# Patient Record
Sex: MALE | Race: OTHER | HISPANIC OR LATINO | Employment: UNEMPLOYED | URBAN - METROPOLITAN AREA
[De-identification: names, ages, dates, MRNs, and addresses within clinical notes are randomized per-mention and may not be internally consistent; named-entity substitution may affect disease eponyms.]

---

## 2019-01-09 ENCOUNTER — HOSPITAL ENCOUNTER (EMERGENCY)
Facility: HOSPITAL | Age: 49
Discharge: HOME | End: 2019-01-09
Attending: EMERGENCY MEDICINE | Admitting: EMERGENCY MEDICINE
Payer: COMMERCIAL

## 2019-01-09 VITALS
SYSTOLIC BLOOD PRESSURE: 99 MMHG | DIASTOLIC BLOOD PRESSURE: 55 MMHG | TEMPERATURE: 98.1 F | WEIGHT: 154 LBS | HEART RATE: 83 BPM | OXYGEN SATURATION: 97 % | RESPIRATION RATE: 18 BRPM

## 2019-01-09 DIAGNOSIS — R11.2 NAUSEA WITH VOMITING: Primary | ICD-10-CM

## 2019-01-09 DIAGNOSIS — R19.7 DIARRHEA: ICD-10-CM

## 2019-01-09 LAB
ALBUMIN SERPL BCP-MCNC: 3.9 G/DL (ref 3.5–5)
ALP SERPL-CCNC: 74 U/L (ref 46–116)
ALT SERPL W P-5'-P-CCNC: 53 U/L (ref 12–78)
ANION GAP SERPL CALCULATED.3IONS-SCNC: 11 MMOL/L (ref 4–13)
AST SERPL W P-5'-P-CCNC: 35 U/L (ref 5–45)
BACTERIA UR QL AUTO: ABNORMAL /HPF
BASOPHILS # BLD AUTO: 0.01 THOUSANDS/ΜL (ref 0–0.1)
BASOPHILS NFR BLD AUTO: 0 % (ref 0–1)
BILIRUB SERPL-MCNC: 0.33 MG/DL (ref 0.2–1)
BILIRUB UR QL STRIP: NEGATIVE
BUN SERPL-MCNC: 24 MG/DL (ref 5–25)
CALCIUM SERPL-MCNC: 9 MG/DL (ref 8.3–10.1)
CHLORIDE SERPL-SCNC: 99 MMOL/L (ref 100–108)
CLARITY UR: CLEAR
CO2 SERPL-SCNC: 25 MMOL/L (ref 21–32)
COLOR UR: YELLOW
COLOR, POC: YELLOW
CREAT SERPL-MCNC: 1.22 MG/DL (ref 0.6–1.3)
EOSINOPHIL # BLD AUTO: 0.04 THOUSAND/ΜL (ref 0–0.61)
EOSINOPHIL NFR BLD AUTO: 1 % (ref 0–6)
ERYTHROCYTE [DISTWIDTH] IN BLOOD BY AUTOMATED COUNT: 11.9 % (ref 11.6–15.1)
GFR SERPL CREATININE-BSD FRML MDRD: 71 ML/MIN/1.73SQ M
GLUCOSE SERPL-MCNC: 171 MG/DL (ref 65–140)
GLUCOSE UR STRIP-MCNC: NEGATIVE MG/DL
HCT VFR BLD AUTO: 44.8 % (ref 36.5–49.3)
HGB BLD-MCNC: 15.1 G/DL (ref 12–17)
HGB UR QL STRIP.AUTO: NEGATIVE
IMM GRANULOCYTES # BLD AUTO: 0.02 THOUSAND/UL (ref 0–0.2)
IMM GRANULOCYTES NFR BLD AUTO: 0 % (ref 0–2)
KETONES UR STRIP-MCNC: NEGATIVE MG/DL
LEUKOCYTE ESTERASE UR QL STRIP: NEGATIVE
LIPASE SERPL-CCNC: 165 U/L (ref 73–393)
LYMPHOCYTES # BLD AUTO: 0.93 THOUSANDS/ΜL (ref 0.6–4.47)
LYMPHOCYTES NFR BLD AUTO: 12 % (ref 14–44)
MAGNESIUM SERPL-MCNC: 1.7 MG/DL (ref 1.6–2.6)
MCH RBC QN AUTO: 31.4 PG (ref 26.8–34.3)
MCHC RBC AUTO-ENTMCNC: 33.7 G/DL (ref 31.4–37.4)
MCV RBC AUTO: 93 FL (ref 82–98)
MONOCYTES # BLD AUTO: 0.43 THOUSAND/ΜL (ref 0.17–1.22)
MONOCYTES NFR BLD AUTO: 6 % (ref 4–12)
MUCOUS THREADS UR QL AUTO: ABNORMAL
NEUTROPHILS # BLD AUTO: 6.34 THOUSANDS/ΜL (ref 1.85–7.62)
NEUTS SEG NFR BLD AUTO: 81 % (ref 43–75)
NITRITE UR QL STRIP: NEGATIVE
NON-SQ EPI CELLS URNS QL MICRO: ABNORMAL /HPF
NRBC BLD AUTO-RTO: 0 /100 WBCS
PH UR STRIP.AUTO: 7.5 [PH] (ref 4.5–8)
PLATELET # BLD AUTO: 216 THOUSANDS/UL (ref 149–390)
PMV BLD AUTO: 10.1 FL (ref 8.9–12.7)
POTASSIUM SERPL-SCNC: 3.7 MMOL/L (ref 3.5–5.3)
PROT SERPL-MCNC: 7.6 G/DL (ref 6.4–8.2)
PROT UR STRIP-MCNC: ABNORMAL MG/DL
RBC # BLD AUTO: 4.81 MILLION/UL (ref 3.88–5.62)
RBC #/AREA URNS AUTO: ABNORMAL /HPF
SODIUM SERPL-SCNC: 135 MMOL/L (ref 136–145)
SP GR UR STRIP.AUTO: 1.02 (ref 1–1.03)
UROBILINOGEN UR QL STRIP.AUTO: 0.2 E.U./DL
WBC # BLD AUTO: 7.77 THOUSAND/UL (ref 4.31–10.16)
WBC #/AREA URNS AUTO: ABNORMAL /HPF

## 2019-01-09 PROCEDURE — 36415 COLL VENOUS BLD VENIPUNCTURE: CPT | Performed by: EMERGENCY MEDICINE

## 2019-01-09 PROCEDURE — 99284 EMERGENCY DEPT VISIT MOD MDM: CPT

## 2019-01-09 PROCEDURE — 96374 THER/PROPH/DIAG INJ IV PUSH: CPT

## 2019-01-09 PROCEDURE — 83735 ASSAY OF MAGNESIUM: CPT | Performed by: EMERGENCY MEDICINE

## 2019-01-09 PROCEDURE — 81001 URINALYSIS AUTO W/SCOPE: CPT

## 2019-01-09 PROCEDURE — 83690 ASSAY OF LIPASE: CPT | Performed by: EMERGENCY MEDICINE

## 2019-01-09 PROCEDURE — 85025 COMPLETE CBC W/AUTO DIFF WBC: CPT | Performed by: EMERGENCY MEDICINE

## 2019-01-09 PROCEDURE — 80053 COMPREHEN METABOLIC PANEL: CPT | Performed by: EMERGENCY MEDICINE

## 2019-01-09 PROCEDURE — 96361 HYDRATE IV INFUSION ADD-ON: CPT

## 2019-01-09 RX ORDER — ONDANSETRON 2 MG/ML
4 INJECTION INTRAMUSCULAR; INTRAVENOUS ONCE
Status: COMPLETED | OUTPATIENT
Start: 2019-01-09 | End: 2019-01-09

## 2019-01-09 RX ORDER — ONDANSETRON 4 MG/1
4 TABLET, ORALLY DISINTEGRATING ORAL EVERY 6 HOURS PRN
Qty: 20 TABLET | Refills: 0 | Status: SHIPPED | OUTPATIENT
Start: 2019-01-09

## 2019-01-09 RX ORDER — TAMSULOSIN HYDROCHLORIDE 0.4 MG/1
0.4 CAPSULE ORAL
COMMUNITY
Start: 2018-03-06

## 2019-01-09 RX ADMIN — ONDANSETRON 4 MG: 2 INJECTION INTRAMUSCULAR; INTRAVENOUS at 10:39

## 2019-01-09 RX ADMIN — SODIUM CHLORIDE 1000 ML: 0.9 INJECTION, SOLUTION INTRAVENOUS at 10:38

## 2019-01-09 NOTE — DISCHARGE INSTRUCTIONS
Abiquiu el zofran según sea necesario para las náuseas, esto puede colocarse debajo de la lengua para disolverse si está vomitando  Asegúrese de beber muchos líquidos  Llame a acosta médico de cabecera; lo veremos en el consultorio para kat evaluación adicional si los síntomas persisten  Náusea y vómito severo, cuidados ambulatorios   INFORMACIÓN GENERAL:   La náusea y vómito severo  empieza de repente, empeora rápidamente y dura un corto periodo de [facility]  La náusea y el vómito pueden ser causados por el embarazo, el alcohol, kat infección o medicamentos  Síntomas relacionados comunes incluyen los siguientes:   · Fiebre    · Inflamación abdominal    · Dolor, sensibilidad o un bulto en el abdomen    · Sonidos salpicantes que se escuchan en acosta estómago cuando usted se mueve  Busque cuidados inmediatos para los siguientes síntomas:   · Connor en acosta vómito o heces    · Dolor repentino y severo en el pecho y parte superior del abdomen después de jose daniel vomitado muy azalea    · Mareos, sequedad en la boca y sed    · Muy poca orina o ausencia completa de la misma    · Debilidad muscular, calambres musculares y dificultad para respirar    · Latidos cardíacos más rápidos de lo normal    · Vómito por más de 50 horas  El tratamiento para la náusea y el vómito severo  puede incluir medicamentos para calmar acosta estómago y parar el vómito  Es probable que usted necesite de líquidos intravenosos si está deshidratado  Maneje acosta náusea y vómito:   · Abiquiu líquidos según indicaciones, para prevenir la deshidratación  Pregunte cuánto líquido reggie [employer] papo y cuáles líquidos son mejores para usted  Es probable que usted necesite reggie un líquido de rehidratación oral  Namrata líquido contiene agua, sales y azúcares que son todos necesarios para reemplazar los líquidos que el cuerpo ha perdido  Pregunte qué tipo de líquidos de rehidratación oral reggie, cuánto reggie y dónde conseguirlos      · Consuma comidas pequeñas con más frecuencia  Consuma cantidades pequeñas de alimentos cada 2 o 3 horas aunque no sienta hambre  Los alimentos en pantoja estómago pueden llegar a [employer]  · Evite el estrés  Busque formas de relajarse y [place] pantoja estrés  Los leroy de iwllem debidos al estrés pueden incluso causar náusea y vómito  Descanse y [employer]  Programe kat avtar con pantoja proveedor de kevin según indicaciones:  Anote raciel preguntas para que las recuerde axel raciel citas de seguimiento  ACUERDOS SOBRE PANTOJA CUIDADO:   Usted tiene el derecho de participar en la planificación de pantoja cuidado  Aprenda todo lo que pueda sobre pantoja condición y wendy darle tratamiento  Discuta con raciel médicos raciel opciones de tratamiento para juntos decidir el cuidado que usted quiere recibir  Usted siempre tiene el derecho a rechazar pantoja tratamiento  Esta información es sólo para uso en educación  Pantoja intención no es darle un consejo médico sobre enfermedades o tratamientos  Colsulte con pantoja Blu [employer] farmacéutico antes de seguir cualquier régimen médico para saber si es seguro y efectivo para usted  © 2014 [address] is for End User's use only and may not be sold, redistributed or otherwise used for commercial purposes  All illustrations and images included in CareNotes® are the copyrighted property of A D A M , Inc  or Chaz Whitaker

## 2019-01-09 NOTE — ED PROVIDER NOTES
History  Chief Complaint   Patient presents with    Abdominal Pain     Patient reports abdominal pain, vomiting, and diarrhea that started yesterday with subjective fevers; reports took Tylenol this morning around 8am      58 YO male presents with nausea, vomiting, diarrhea and abdominal discomfort  States this began overnight  Pt has had multiple episodes of NBNB emesis as well as loose and watery bowel movements  States generally upset stomach, no focal pain, this has been moderate in intensity, it has been intermittent and has improved over the course of the morning  Pt states his nausea is improved, has not vomited in the last few hours  Pt states he has multiple family members with similar symptoms, wife had same last week and has improved  Pt denies fevers or chills  Pt denies CP/SOB/F/C, no dysuria, burning on urination or blood in urine  History provided by:  Patient and relative   used: No    Abdominal Pain   Pain location:  Generalized  Pain quality: aching    Pain radiates to:  Does not radiate  Pain severity:  Mild  Onset quality:  Gradual  Duration:  1 day  Timing:  Intermittent  Progression:  Waxing and waning  Chronicity:  New  Context: sick contacts    Relieved by:  Nothing  Worsened by:  Nothing  Ineffective treatments:  None tried  Associated symptoms: diarrhea, nausea and vomiting    Associated symptoms: no chest pain, no dysuria, no fever and no shortness of breath    Diarrhea:     Quality:  Semi-solid    Severity:  Moderate    Duration:  1 day    Progression:  Improving  Nausea:     Severity:  Moderate    Onset quality:  Gradual    Duration:  1 day    Timing:  Constant    Progression:  Waxing and waning  Vomiting:     Quality:  Stomach contents    Severity:  Moderate    Duration:  1 day    Timing:  Intermittent    Progression:  Improving      Prior to Admission Medications   Prescriptions Last Dose Informant Patient Reported?  Taking?   tamsulosin (FLOMAX) 0 4 mg Yes Yes   Sig: Take 0 4 mg by mouth      Facility-Administered Medications: None       Past Medical History:   Diagnosis Date    No known health problems        Past Surgical History:   Procedure Laterality Date    NO PAST SURGERIES         History reviewed  No pertinent family history  I have reviewed and agree with the history as documented  Social History   Substance Use Topics    Smoking status: Never Smoker    Smokeless tobacco: Never Used    Alcohol use No        Review of Systems   Constitutional: Negative for fever  HENT: Negative for dental problem  Eyes: Negative for visual disturbance  Respiratory: Negative for shortness of breath  Cardiovascular: Negative for chest pain  Gastrointestinal: Positive for abdominal pain, diarrhea, nausea and vomiting  Genitourinary: Negative for dysuria and frequency  Musculoskeletal: Negative for neck pain and neck stiffness  Skin: Negative for rash  Neurological: Negative for dizziness, weakness and light-headedness  Psychiatric/Behavioral: Negative for agitation, behavioral problems and confusion  All other systems reviewed and are negative  Physical Exam  Physical Exam   Constitutional: He is oriented to person, place, and time  He appears well-developed and well-nourished  HENT:   Head: Normocephalic and atraumatic  Eyes: EOM are normal    Neck: Normal range of motion  Cardiovascular: Normal rate, regular rhythm and normal heart sounds  Pulmonary/Chest: Effort normal and breath sounds normal    Abdominal: Soft  There is no tenderness  Musculoskeletal: Normal range of motion  Neurological: He is alert and oriented to person, place, and time  Skin: Skin is warm and dry  Psychiatric: He has a normal mood and affect  His behavior is normal    Nursing note and vitals reviewed        Vital Signs  ED Triage Vitals [01/09/19 0956]   Temperature Pulse Respirations Blood Pressure SpO2   98 1 °F (36 7 °C) 72 18 100/54 99 % Temp Source Heart Rate Source Patient Position - Orthostatic VS BP Location FiO2 (%)   Oral Monitor Sitting Right arm --      Pain Score       Worst Possible Pain           Vitals:    01/09/19 0956 01/09/19 1154   BP: 100/54 99/55   Pulse: 72 83   Patient Position - Orthostatic VS: Sitting Lying       Visual Acuity      ED Medications  Medications   sodium chloride 0 9 % bolus 1,000 mL (0 mL Intravenous Stopped 1/9/19 1146)   ondansetron (ZOFRAN) injection 4 mg (4 mg Intravenous Given 1/9/19 1039)       Diagnostic Studies  Results Reviewed     Procedure Component Value Units Date/Time    Urine Microscopic [[id number]]  (Abnormal) Collected:  01/09/19 1204    Lab Status:  Final result Specimen:  Urine from Urine, Clean Catch Updated:  01/09/19 1210     RBC, UA 0-1 (A) /hpf      WBC, UA 1-2 (A) /hpf      Epithelial Cells Occasional /hpf      Bacteria, UA Innumerable (A) /hpf      MUCUS THREADS Occasional (A)    POCT urinalysis dipstick [[id number]]  (Abnormal) Resulted:  01/09/19 1153    Lab Status:  Final result Specimen:  Urine Updated:  01/09/19 1153     Color, UA yellow    ED Urine Macroscopic [[id number]]  (Abnormal) Collected:  01/09/19 1204    Lab Status:  Final result Specimen:  Urine Updated:  01/09/19 1148     Color, UA Yellow     Clarity, UA Clear     pH, UA 7 5     Leukocytes, UA Negative     Nitrite, UA Negative     Protein, UA 30 (1+) (A) mg/dl      Glucose, UA Negative mg/dl      Ketones, UA Negative mg/dl      Urobilinogen, UA 0 2 E U /dl      Bilirubin, UA Negative     Blood, UA Negative     Specific Gravity, UA 1 020    Narrative:       CLINITEK RESULT    Comprehensive metabolic panel [[id number]]  (Abnormal) Collected:  01/09/19 1037    Lab Status:  Final result Specimen:  Blood from Arm, Right Updated:  01/09/19 1058     Sodium 135 (L) mmol/L      Potassium 3 7 mmol/L      Chloride 99 (L) mmol/L      CO2 25 mmol/L      ANION GAP 11 mmol/L      BUN 24 mg/dL      Creatinine 1 22 mg/dL      Glucose 171 (H) mg/dL      Calcium 9 0 mg/dL      AST 35 U/L      ALT 53 U/L      Alkaline Phosphatase 74 U/L      Total Protein 7 6 g/dL      Albumin 3 9 g/dL      Total Bilirubin 0 33 mg/dL      eGFR 71 ml/min/1 73sq m     Narrative:         National Kidney Disease Education Program recommendations are as follows:  GFR calculation is accurate only with a steady state creatinine  Chronic Kidney disease less than 60 ml/min/1 73 sq  meters  Kidney failure less than 15 ml/min/1 73 sq  meters      Lipase [[id number]]  (Normal) Collected:  01/09/19 1037    Lab Status:  Final result Specimen:  Blood from Arm, Right Updated:  01/09/19 1058     Lipase 165 u/L     Magnesium [[id number]]  (Normal) Collected:  01/09/19 1037    Lab Status:  Final result Specimen:  Blood from Arm, Right Updated:  01/09/19 1058     Magnesium 1 7 mg/dL     CBC and differential [[id number]]  (Abnormal) Collected:  01/09/19 1037    Lab Status:  Final result Specimen:  Blood from Arm, Right Updated:  01/09/19 1047     WBC 7 77 Thousand/uL      RBC 4 81 Million/uL      Hemoglobin 15 1 g/dL      Hematocrit 44 8 %      MCV 93 fL      MCH 31 4 pg      MCHC 33 7 g/dL      RDW 11 9 %      MPV 10 1 fL      Platelets 938 Thousands/uL      nRBC 0 /100 WBCs      Neutrophils Relative 81 (H) %      Immat GRANS % 0 %      Lymphocytes Relative 12 (L) %      Monocytes Relative 6 %      Eosinophils Relative 1 %      Basophils Relative 0 %      Neutrophils Absolute 6 34 Thousands/µL      Immature Grans Absolute 0 02 Thousand/uL      Lymphocytes Absolute 0 93 Thousands/µL      Monocytes Absolute 0 43 Thousand/µL      Eosinophils Absolute 0 04 Thousand/µL      Basophils Absolute 0 01 Thousands/µL                  No orders to display              Procedures  Procedures       Phone Contacts  ED Phone Contact    ED Course                               MDM  Number of Diagnoses or Management Options  Diarrhea: new and requires workup  Nausea with vomiting: new and requires workup  Diagnosis management comments: 1  N/V/D - Pt has sick contacts with similar  Likely viral in origin  Pt states symptoms seemed to be improved prior to presentation in the ED  Will check urine, electrolytes, CBC  Give fluids, anti-emetics  Amount and/or Complexity of Data Reviewed  Clinical lab tests: ordered and reviewed  Obtain history from someone other than the patient: yes    Patient Progress  Patient progress: stable    CritCare Time    Disposition  Final diagnoses:   Nausea with vomiting   Diarrhea     Time reflects when diagnosis was documented in both MDM as applicable and the Disposition within this note     Time User Action Codes Description Comment    1/9/2019 12:13 PM Emeli Apley E Add [R11 2] Nausea with vomiting     1/9/2019 12:13 PM Emeli Apley E Add [R19 7] Diarrhea       ED Disposition     ED Disposition Condition Comment    Discharge  [address] discharge to home/self care  Condition at discharge: Stable        Follow-up Information     Follow up With Specialties Details Why Contact Info    Franklin Benavides MD Family Medicine Schedule an appointment as soon as possible for a visit  [address]  [facility]  [phone]      [employer]  Schedule an appointment as soon as possible for a visit  [phone]      Franklin Benavides, [address] [facility]  [address] [facility] [address]  [phone]            Discharge Medication List as of 1/9/2019 12:27 PM      START taking these medications    Details   ondansetron (ZOFRAN-ODT) 4 mg disintegrating tablet Take 1 tablet (4 mg total) by mouth every 6 (six) hours as needed for nausea or vomiting, Starting Wed 1/9/2019, Print         CONTINUE these medications which have NOT CHANGED    Details   tamsulosin (FLOMAX) 0 4 mg Take 0 4 mg by mouth, Starting Tue 3/6/2018, Historical Med           No discharge procedures on file      ED Provider  Electronically Signed by           Hans Valencia MD  01/11/19 5181

## 2019-02-25 ENCOUNTER — HOSPITAL ENCOUNTER (EMERGENCY)
Facility: HOSPITAL | Age: 49
Discharge: HOME | End: 2019-02-25
Attending: EMERGENCY MEDICINE | Admitting: EMERGENCY MEDICINE
Payer: COMMERCIAL

## 2019-02-25 VITALS
WEIGHT: 153.22 LBS | TEMPERATURE: 97.5 F | DIASTOLIC BLOOD PRESSURE: 75 MMHG | SYSTOLIC BLOOD PRESSURE: 163 MMHG | RESPIRATION RATE: 18 BRPM | HEART RATE: 74 BPM | OXYGEN SATURATION: 98 %

## 2019-02-25 DIAGNOSIS — K21.9 GERD (GASTROESOPHAGEAL REFLUX DISEASE): Primary | ICD-10-CM

## 2019-02-25 LAB
ALBUMIN SERPL BCP-MCNC: 3.8 G/DL (ref 3.5–5)
ALP SERPL-CCNC: 57 U/L (ref 46–116)
ALT SERPL W P-5'-P-CCNC: 41 U/L (ref 12–78)
ANION GAP SERPL CALCULATED.3IONS-SCNC: 7 MMOL/L (ref 4–13)
AST SERPL W P-5'-P-CCNC: 25 U/L (ref 5–45)
BASOPHILS # BLD AUTO: 0.03 THOUSANDS/ΜL (ref 0–0.1)
BASOPHILS NFR BLD AUTO: 1 % (ref 0–1)
BILIRUB SERPL-MCNC: 0.26 MG/DL (ref 0.2–1)
BUN SERPL-MCNC: 19 MG/DL (ref 5–25)
CALCIUM SERPL-MCNC: 8.8 MG/DL (ref 8.3–10.1)
CHLORIDE SERPL-SCNC: 103 MMOL/L (ref 100–108)
CO2 SERPL-SCNC: 29 MMOL/L (ref 21–32)
CREAT SERPL-MCNC: 0.95 MG/DL (ref 0.6–1.3)
EOSINOPHIL # BLD AUTO: 0.1 THOUSAND/ΜL (ref 0–0.61)
EOSINOPHIL NFR BLD AUTO: 2 % (ref 0–6)
ERYTHROCYTE [DISTWIDTH] IN BLOOD BY AUTOMATED COUNT: 12.1 % (ref 11.6–15.1)
GFR SERPL CREATININE-BSD FRML MDRD: 96 ML/MIN/1.73SQ M
GLUCOSE SERPL-MCNC: 93 MG/DL (ref 65–140)
HCT VFR BLD AUTO: 42.2 % (ref 36.5–49.3)
HGB BLD-MCNC: 14 G/DL (ref 12–17)
IMM GRANULOCYTES # BLD AUTO: 0.01 THOUSAND/UL (ref 0–0.2)
IMM GRANULOCYTES NFR BLD AUTO: 0 % (ref 0–2)
LIPASE SERPL-CCNC: 161 U/L (ref 73–393)
LYMPHOCYTES # BLD AUTO: 2.44 THOUSANDS/ΜL (ref 0.6–4.47)
LYMPHOCYTES NFR BLD AUTO: 42 % (ref 14–44)
MCH RBC QN AUTO: 31.1 PG (ref 26.8–34.3)
MCHC RBC AUTO-ENTMCNC: 33.2 G/DL (ref 31.4–37.4)
MCV RBC AUTO: 94 FL (ref 82–98)
MONOCYTES # BLD AUTO: 0.4 THOUSAND/ΜL (ref 0.17–1.22)
MONOCYTES NFR BLD AUTO: 7 % (ref 4–12)
NEUTROPHILS # BLD AUTO: 2.77 THOUSANDS/ΜL (ref 1.85–7.62)
NEUTS SEG NFR BLD AUTO: 48 % (ref 43–75)
NRBC BLD AUTO-RTO: 0 /100 WBCS
PLATELET # BLD AUTO: 214 THOUSANDS/UL (ref 149–390)
PMV BLD AUTO: 9.7 FL (ref 8.9–12.7)
POTASSIUM SERPL-SCNC: 3.8 MMOL/L (ref 3.5–5.3)
PROT SERPL-MCNC: 7.5 G/DL (ref 6.4–8.2)
RBC # BLD AUTO: 4.5 MILLION/UL (ref 3.88–5.62)
SODIUM SERPL-SCNC: 139 MMOL/L (ref 136–145)
TROPONIN I SERPL-MCNC: <0.02 NG/ML
WBC # BLD AUTO: 5.75 THOUSAND/UL (ref 4.31–10.16)

## 2019-02-25 PROCEDURE — 99283 EMERGENCY DEPT VISIT LOW MDM: CPT

## 2019-02-25 PROCEDURE — 85025 COMPLETE CBC W/AUTO DIFF WBC: CPT | Performed by: EMERGENCY MEDICINE

## 2019-02-25 PROCEDURE — 36415 COLL VENOUS BLD VENIPUNCTURE: CPT | Performed by: EMERGENCY MEDICINE

## 2019-02-25 PROCEDURE — 84484 ASSAY OF TROPONIN QUANT: CPT | Performed by: EMERGENCY MEDICINE

## 2019-02-25 PROCEDURE — 80053 COMPREHEN METABOLIC PANEL: CPT | Performed by: EMERGENCY MEDICINE

## 2019-02-25 PROCEDURE — 83690 ASSAY OF LIPASE: CPT | Performed by: EMERGENCY MEDICINE

## 2019-02-25 PROCEDURE — 93005 ELECTROCARDIOGRAM TRACING: CPT

## 2019-02-25 RX ORDER — SUCRALFATE 1 G/1
1 TABLET ORAL 4 TIMES DAILY
Qty: 10 TABLET | Refills: 0 | Status: SHIPPED | OUTPATIENT
Start: 2019-02-25

## 2019-02-25 RX ORDER — MAGNESIUM HYDROXIDE/ALUMINUM HYDROXICE/SIMETHICONE 120; 1200; 1200 MG/30ML; MG/30ML; MG/30ML
30 SUSPENSION ORAL ONCE
Status: DISCONTINUED | OUTPATIENT
Start: 2019-02-25 | End: 2019-02-25 | Stop reason: HOSPADM

## 2019-02-25 RX ORDER — FAMOTIDINE 20 MG/1
20 TABLET, FILM COATED ORAL ONCE
Status: DISCONTINUED | OUTPATIENT
Start: 2019-02-25 | End: 2019-02-25 | Stop reason: HOSPADM

## 2019-02-25 RX ORDER — FAMOTIDINE 20 MG/1
20 TABLET, FILM COATED ORAL 2 TIMES DAILY
Qty: 30 TABLET | Refills: 0 | Status: SHIPPED | OUTPATIENT
Start: 2019-02-25

## 2019-02-25 NOTE — ED PROVIDER NOTES
History  Chief Complaint   Patient presents with    Heartburn     patient with "squeezing" feeling that travels up and down from throat to upper abdomen for 1 month  deneis cough, n/v/d  intermittant  "Feels like something is going up and down my throat" x 1 month  Reports sometimes feels it come up more while he's sleeping  Feels some burning, but states it's not painful  Intermittent  Denies abd pain, N/V  Not worsened with food  He has not attempted to see his PCP for this issue  History provided by:  Patient   used: Yes ([employer] [id number])    Heartburn   Location:  Chest and throat  Duration:  1 month  Timing:  Intermittent  Progression:  Unchanged  Chronicity:  New  Relieved by:  Nothing tried  Worsened by:  Food  Ineffective treatments:  Nothing tried  Associated symptoms: no abdominal pain, no chest pain, no cough, no fever, no nausea, no shortness of breath and no vomiting        Prior to Admission Medications   Prescriptions Last Dose Informant Patient Reported? Taking?   ondansetron (ZOFRAN-ODT) 4 mg disintegrating tablet   No No   Sig: Take 1 tablet (4 mg total) by mouth every 6 (six) hours as needed for nausea or vomiting   tamsulosin (FLOMAX) 0 4 mg   Yes No   Sig: Take 0 4 mg by mouth      Facility-Administered Medications: None       Past Medical History:   Diagnosis Date    No known health problems        Past Surgical History:   Procedure Laterality Date    NO PAST SURGERIES         History reviewed  No pertinent family history  I have reviewed and agree with the history as documented  Social History     Tobacco Use    Smoking status: Never Smoker    Smokeless tobacco: Never Used   Substance Use Topics    Alcohol use: No    Drug use: No        Review of Systems   Constitutional: Negative for chills, diaphoresis and fever  Respiratory: Negative for cough, chest tightness and shortness of breath      Cardiovascular: Negative for chest pain, palpitations and leg swelling  Gastrointestinal: Negative for abdominal pain, nausea and vomiting  Musculoskeletal: Negative for back pain and neck pain  Skin: Negative for pallor  Neurological: Negative for dizziness, syncope, facial asymmetry, speech difficulty, weakness, light-headedness and numbness  All other systems reviewed and are negative  Physical Exam  Physical Exam   Constitutional: He is oriented to person, place, and time  He appears well-developed and well-nourished  No distress  HENT:   Head: Normocephalic  Mouth/Throat: Oropharynx is clear and moist and mucous membranes are normal    Neck: Normal range of motion  Neck supple  Cardiovascular: Normal rate, regular rhythm, normal heart sounds and intact distal pulses  Exam reveals no gallop and no friction rub  No murmur heard  Pulmonary/Chest: Effort normal and breath sounds normal  No respiratory distress  He has no wheezes  He has no rales  Abdominal: Soft  Normal appearance and bowel sounds are normal  He exhibits no distension and no mass  There is no hepatosplenomegaly  There is no tenderness  There is no rigidity, no rebound, no guarding, no CVA tenderness, no tenderness at McBurney's point and negative Graff's sign  Lymphadenopathy:     He has no cervical adenopathy  Neurological: He is alert and oriented to person, place, and time  Skin: Skin is warm, dry and intact  No rash noted  No pallor  Nursing note and vitals reviewed        Vital Signs  ED Triage Vitals [02/25/19 1810]   Temperature Pulse Respirations Blood Pressure SpO2   97 5 °F (36 4 °C) 74 18 163/75 98 %      Temp Source Heart Rate Source Patient Position - Orthostatic VS BP Location FiO2 (%)   Oral Monitor Sitting Right arm --      Pain Score       No Pain           Vitals:    02/25/19 1810   BP: 163/75   Pulse: 74   Patient Position - Orthostatic VS: Sitting       Visual Acuity      ED Medications  Medications   aluminum-magnesium hydroxide-simethicone (MYLANTA) 200-200-20 mg/5 mL oral suspension 30 mL (30 mL Oral Not Given 2/25/19 1835)   lidocaine viscous (XYLOCAINE) 2 % mucosal solution 15 mL (15 mL Swish & Spit Not Given 2/25/19 1835)   famotidine (PEPCID) tablet 20 mg (20 mg Oral Not Given 2/25/19 1835)       Diagnostic Studies  Results Reviewed     Procedure Component Value Units Date/Time    Troponin I [[id number]]  (Normal) Collected:  02/25/19 1840    Lab Status:  Final result Specimen:  Blood from Arm, Left Updated:  02/25/19 1903     Troponin I <0 02 ng/mL     Comprehensive metabolic panel [[id number]] Collected:  02/25/19 1840    Lab Status:  Final result Specimen:  Blood from Arm, Left Updated:  02/25/19 1902     Sodium 139 mmol/L      Potassium 3 8 mmol/L      Chloride 103 mmol/L      CO2 29 mmol/L      ANION GAP 7 mmol/L      BUN 19 mg/dL      Creatinine 0 95 mg/dL      Glucose 93 mg/dL      Calcium 8 8 mg/dL      AST 25 U/L      ALT 41 U/L      Alkaline Phosphatase 57 U/L      Total Protein 7 5 g/dL      Albumin 3 8 g/dL      Total Bilirubin 0 26 mg/dL      eGFR 96 ml/min/1 73sq m     Narrative:       National Kidney Disease Education Program recommendations are as follows:  GFR calculation is accurate only with a steady state creatinine  Chronic Kidney disease less than 60 ml/min/1 73 sq  meters  Kidney failure less than 15 ml/min/1 73 sq  meters      Lipase [[id number]]  (Normal) Collected:  02/25/19 1840    Lab Status:  Final result Specimen:  Blood from Arm, Left Updated:  02/25/19 1902     Lipase 161 u/L     CBC and differential [[id number]] Collected:  02/25/19 1840    Lab Status:  Final result Specimen:  Blood from Arm, Left Updated:  02/25/19 1845     WBC 5 75 Thousand/uL      RBC 4 50 Million/uL      Hemoglobin 14 0 g/dL      Hematocrit 42 2 %      MCV 94 fL      MCH 31 1 pg      MCHC 33 2 g/dL      RDW 12 1 %      MPV 9 7 fL      Platelets 185 Thousands/uL      nRBC 0 /100 WBCs      Neutrophils Relative 48 %      Immat GRANS % 0 %      Lymphocytes Relative 42 %      Monocytes Relative 7 %      Eosinophils Relative 2 %      Basophils Relative 1 %      Neutrophils Absolute 2 77 Thousands/µL      Immature Grans Absolute 0 01 Thousand/uL      Lymphocytes Absolute 2 44 Thousands/µL      Monocytes Absolute 0 40 Thousand/µL      Eosinophils Absolute 0 10 Thousand/µL      Basophils Absolute 0 03 Thousands/µL                  No orders to display              Procedures  ECG 12 Lead Documentation  Date/Time: 2/25/2019 6:34 PM  Performed by: Reji Blanco,   Authorized by: Reji Blanco, DO     Indications / Diagnosis:  Reflux symptoms  ECG reviewed by me, the ED Provider: yes    Patient location:  Bedside  Previous ECG:     Previous ECG:  Unavailable  Rate:     ECG rate:  59    ECG rate assessment: normal    Rhythm:     Rhythm: sinus rhythm    Ectopy:     Ectopy: none    QRS:     QRS axis:  Normal    QRS intervals:  Normal  ST segments:     ST segments:  Normal  T waves:     T waves: normal             Phone Contacts  ED Phone Contact    ED Course                               MDM  Number of Diagnoses or Management Options  GERD (gastroesophageal reflux disease):   Diagnosis management comments: Symptoms c/w reflux (explained this to pt and that if labs are negative, he will f/u with PCP) - Will check CBC as marker of infection, CMP to r/o hepatitis/biliary disease, lipase to r/o pancreatitis, EKG to r/o STEMI, troponin to r/o NSTEMI  Will give pepcid/GI cocktail         Amount and/or Complexity of Data Reviewed  Clinical lab tests: reviewed and ordered  Tests in the medicine section of CPT®: ordered and reviewed        Disposition  Final diagnoses:   GERD (gastroesophageal reflux disease)     Time reflects when diagnosis was documented in both MDM as applicable and the Disposition within this note     Time User Action Codes Description Comment    2/25/2019  7:04 PM Alyce Pollard Add [K21 9] GERD (gastroesophageal reflux disease)       ED Disposition     ED Disposition Condition Date/Time Comment    Discharge Stable Mon Feb 25, 2019  7:04 PM Aniceto Beasley discharge to home/self care  Follow-up Information     Follow up With Specialties Details Why Contact Info    Erasto Pollard MD Family Medicine Schedule an appointment as soon as possible for a visit   [address]  [address] [phone]            Patient's Medications   Discharge Prescriptions    FAMOTIDINE (PEPCID) 20 MG TABLET    Take 1 tablet (20 mg total) by mouth 2 (two) times a day       Start Date: 2/25/2019 End Date: --       Order Dose: 20 mg       Quantity: 30 tablet    Refills: 0    SUCRALFATE (CARAFATE) 1 G TABLET    Take 1 tablet (1 g total) by mouth 4 (four) times a day       Start Date: 2/25/2019 End Date: --       Order Dose: 1 g       Quantity: 10 tablet    Refills: 0     No discharge procedures on file      ED Provider  Electronically Signed by           Reji Blanco, DO  02/25/19 2824

## 2019-02-26 LAB
ATRIAL RATE: 59 BPM
P AXIS: 64 DEGREES
PR INTERVAL: 190 MS
QRS AXIS: 19 DEGREES
QRSD INTERVAL: 98 MS
QT INTERVAL: 404 MS
QTC INTERVAL: 399 MS
T WAVE AXIS: 31 DEGREES
VENTRICULAR RATE: 59 BPM

## 2019-02-26 PROCEDURE — 93010 ELECTROCARDIOGRAM REPORT: CPT | Performed by: INTERNAL MEDICINE

## 2019-08-09 ENCOUNTER — HOSPITAL ENCOUNTER (EMERGENCY)
Facility: HOSPITAL | Age: 49
Discharge: HOME | End: 2019-08-09
Attending: EMERGENCY MEDICINE
Payer: COMMERCIAL

## 2019-08-09 VITALS
TEMPERATURE: 98.2 F | DIASTOLIC BLOOD PRESSURE: 75 MMHG | WEIGHT: 147.27 LBS | HEART RATE: 52 BPM | RESPIRATION RATE: 16 BRPM | OXYGEN SATURATION: 98 % | SYSTOLIC BLOOD PRESSURE: 143 MMHG

## 2019-08-09 DIAGNOSIS — R35.0 URINARY FREQUENCY: ICD-10-CM

## 2019-08-09 DIAGNOSIS — R10.9 ABDOMINAL PAIN: Primary | ICD-10-CM

## 2019-08-09 LAB
BILIRUB UR QL STRIP: NEGATIVE
CLARITY UR: CLEAR
COLOR UR: YELLOW
COLOR, POC: YELLOW
GLUCOSE UR STRIP-MCNC: NEGATIVE MG/DL
HGB UR QL STRIP.AUTO: NEGATIVE
KETONES UR STRIP-MCNC: NEGATIVE MG/DL
LEUKOCYTE ESTERASE UR QL STRIP: NEGATIVE
NITRITE UR QL STRIP: NEGATIVE
PH UR STRIP.AUTO: 5.5 [PH] (ref 4.5–8)
PROT UR STRIP-MCNC: NEGATIVE MG/DL
SP GR UR STRIP.AUTO: 1.02 (ref 1–1.03)
UROBILINOGEN UR QL STRIP.AUTO: 0.2 E.U./DL

## 2019-08-09 PROCEDURE — 99283 EMERGENCY DEPT VISIT LOW MDM: CPT | Performed by: EMERGENCY MEDICINE

## 2019-08-09 PROCEDURE — 99284 EMERGENCY DEPT VISIT MOD MDM: CPT

## 2019-08-09 PROCEDURE — 81003 URINALYSIS AUTO W/O SCOPE: CPT

## 2019-08-09 NOTE — ED PROVIDER NOTES
History  Chief Complaint   Patient presents with    Pelvic Pain     on tamsulosin   4 mg x 5 years  now reports pain to lower pelvis/penis, constant  urination w/out difficulty, but "it slows at night"  denies fever/chills  History provided by:  Patient   used: No    Pelvic Pain   Associated symptoms: abdominal pain    Associated symptoms: no chest pain, no cough, no diarrhea, no fever, no headaches, no nausea, no rash, no shortness of breath, no sore throat and no vomiting    Abdominal pain:     Location:  LLQ    Quality: aching      Severity:  Mild    Onset quality:  Gradual    Duration:  5 days    Timing:  Intermittent    Progression:  Waxing and waning    Chronicity:  New  Abdominal Pain   Associated symptoms: no chest pain, no chills, no cough, no diarrhea, no dysuria, no fever, no nausea, no shortness of breath, no sore throat and no vomiting        Prior to Admission Medications   Prescriptions Last Dose Informant Patient Reported? Taking?   famotidine (PEPCID) 20 mg tablet   No No   Sig: Take 1 tablet (20 mg total) by mouth 2 (two) times a day   ondansetron (ZOFRAN-ODT) 4 mg disintegrating tablet   No No   Sig: Take 1 tablet (4 mg total) by mouth every 6 (six) hours as needed for nausea or vomiting   sucralfate (CARAFATE) 1 g tablet   No No   Sig: Take 1 tablet (1 g total) by mouth 4 (four) times a day   tamsulosin (FLOMAX) 0 4 mg   Yes No   Sig: Take 0 4 mg by mouth      Facility-Administered Medications: None       Past Medical History:   Diagnosis Date    No known health problems        Past Surgical History:   Procedure Laterality Date    NO PAST SURGERIES         History reviewed  No pertinent family history  I have reviewed and agree with the history as documented      Social History     Tobacco Use    Smoking status: Never Smoker    Smokeless tobacco: Never Used   Substance Use Topics    Alcohol use: No    Drug use: No        Review of Systems   Constitutional: Negative for chills and fever  HENT: Negative for facial swelling, sore throat and trouble swallowing  Eyes: Negative for pain and visual disturbance  Respiratory: Negative for cough and shortness of breath  Cardiovascular: Negative for chest pain and leg swelling  Gastrointestinal: Positive for abdominal pain  Negative for blood in stool, diarrhea, nausea and vomiting  Genitourinary: Positive for pelvic pain  Negative for dysuria and flank pain  Musculoskeletal: Negative for back pain, neck pain and neck stiffness  Skin: Negative for pallor and rash  Allergic/Immunologic: Negative for environmental allergies and immunocompromised state  Neurological: Negative for dizziness and headaches  Hematological: Negative for adenopathy  Does not bruise/bleed easily  Psychiatric/Behavioral: Negative for agitation and behavioral problems  All other systems reviewed and are negative  Physical Exam  Physical Exam   Constitutional: He is oriented to person, place, and time  He appears well-developed and well-nourished  No distress  HENT:   Head: Normocephalic and atraumatic  Eyes: EOM are normal    Neck: Normal range of motion  Neck supple  Cardiovascular: Normal rate, regular rhythm, normal heart sounds and intact distal pulses  Pulmonary/Chest: Effort normal and breath sounds normal    Abdominal: Soft  Bowel sounds are normal  There is no tenderness  There is no rebound and no guarding  Musculoskeletal: Normal range of motion  Neurological: He is alert and oriented to person, place, and time  Skin: Skin is warm and dry  Psychiatric: He has a normal mood and affect  Nursing note and vitals reviewed        Vital Signs  ED Triage Vitals   Temperature Pulse Respirations Blood Pressure SpO2   08/09/19 1322 08/09/19 1322 08/09/19 1322 08/09/19 1322 08/09/19 1322   98 2 °F (36 8 °C) 56 20 130/81 97 %      Temp src Heart Rate Source Patient Position - Orthostatic VS BP Location FiO2 (%) -- 08/09/19 1528 08/09/19 1528 08/09/19 1528 --    Monitor Lying Right arm       Pain Score       08/09/19 1322       2           Vitals:    08/09/19 1322 08/09/19 1528   BP: 130/81 143/75   Pulse: 56 (!) 52   Patient Position - Orthostatic VS:  Lying         Visual Acuity      ED Medications  Medications - No data to display    Diagnostic Studies  Results Reviewed     Procedure Component Value Units Date/Time    POCT urinalysis dipstick [[id number]]  (Normal) Resulted:  08/09/19 1507    Lab Status:  Final result Specimen:  Urine Updated:  08/09/19 1507     Color, UA yellow    ED Urine Macroscopic [[id number]] Collected:  08/09/19 1520    Lab Status:  Final result Specimen:  Urine Updated:  08/09/19 1506     Color, UA Yellow     Clarity, UA Clear     pH, UA 5 5     Leukocytes, UA Negative     Nitrite, UA Negative     Protein, UA Negative mg/dl      Glucose, UA Negative mg/dl      Ketones, UA Negative mg/dl      Urobilinogen, UA 0 2 E U /dl      Bilirubin, UA Negative     Blood, UA Negative     Specific Gravity, UA 1 025    Narrative:       CLINITEK RESULT                 No orders to display              Procedures  Procedures       ED Course  ED Course as of Aug 09 1742   Fri Aug 09, 2019   1516 Leukocytes, UA: Negative   1516 Nitrite, UA: Negative   1516 Urine normal   Patient informed about the urine results as his main concern was possible UTI     Blood, UA: Negative   1555 Bladder scan 17 ml       1559 Patient re-evaluated, in no acute distress, no acute pain, no flank pain or CVA tenderness, doubt renal colic based on presentation however with possible urinary symptoms, on tamsulosin for many years, advise follow-up with the urologist                                   MDM  Number of Diagnoses or Management Options  Abdominal pain:   Urinary frequency: new and requires workup  Diagnosis management comments: A 59-year-old male, comes in with complaints of left lower quadrant abdominal pain, states that the pain has been going on for few days, feels that he may have a UTI has history of same although denies flank pain, fever, dysuria, hematuria, diarrhea  On exam no acute distress:  Vital signs stable, patient is well appearing, abdomen is soft nontender, no CVA tenderness  Urine is negative for infection  CT scan 17 ml  Patient informed about no infection in the urine as that was his main concern  No urinary retention  Will advise follow-up with PCP/urologist        Amount and/or Complexity of Data Reviewed  Clinical lab tests: ordered and reviewed  Tests in the medicine section of CPT®: reviewed and ordered        Disposition  Final diagnoses:   Abdominal pain   Urinary frequency     Time reflects when diagnosis was documented in both MDM as applicable and the Disposition within this note     Time User Action Codes Description Comment    8/9/2019  4:00 PM Gwenette Meals Add [R10 9] Abdominal pain     8/9/2019  4:00 PM Gwenette Meals Add [R35 0] Urinary frequency       ED Disposition     ED Disposition Condition Date/Time Comment    Discharge Stable Fri Aug 9, 2019  3:59 PM Tejal Carr discharge to home/self care              Follow-up Information     Follow up With Specialties Details Why Contact Info Additional [address] For Urology [facility] Schedule an appointment as soon as possible for a visit   [address] [address]  [address] [place] [address] For Urology [facility], [address], [address]    Bj Rodriguez MD Family Medicine Schedule an appointment as soon as possible for a visit   [address]  [phone]             Discharge Medication List as of 8/9/2019  4:00 PM      CONTINUE these medications which have NOT CHANGED    Details   famotidine (PEPCID) 20 mg tablet Take 1 tablet (20 mg total) by mouth 2 (two) times a day, Starting Mon 2/25/2019, Print      ondansetron (ZOFRAN-ODT) 4 mg disintegrating tablet Take 1 tablet (4 mg total) by mouth every 6 (six) hours as needed for nausea or vomiting, Starting Wed 1/9/2019, Print      sucralfate (CARAFATE) 1 g tablet Take 1 tablet (1 g total) by mouth 4 (four) times a day, Starting Mon 2/25/2019, Print      tamsulosin (FLOMAX) 0 4 mg Take 0 4 mg by mouth, Starting Tue 3/6/2018, Historical Med           No discharge procedures on file      ED Provider  Electronically Signed by           Jennifer Mace MD  08/09/19 5371

## 2019-10-01 ENCOUNTER — HOSPITAL ENCOUNTER (EMERGENCY)
Facility: HOSPITAL | Age: 49
Discharge: HOME | End: 2019-10-01
Attending: EMERGENCY MEDICINE
Payer: COMMERCIAL

## 2019-10-01 VITALS
WEIGHT: 151.01 LBS | SYSTOLIC BLOOD PRESSURE: 128 MMHG | HEART RATE: 61 BPM | DIASTOLIC BLOOD PRESSURE: 60 MMHG | TEMPERATURE: 98.1 F | RESPIRATION RATE: 18 BRPM | OXYGEN SATURATION: 96 %

## 2019-10-01 DIAGNOSIS — N39.0 UTI (URINARY TRACT INFECTION): Primary | ICD-10-CM

## 2019-10-01 DIAGNOSIS — R10.2 SUPRAPUBIC ABDOMINAL PAIN: ICD-10-CM

## 2019-10-01 DIAGNOSIS — R30.0 DYSURIA: ICD-10-CM

## 2019-10-01 LAB
AMORPH PHOS CRY URNS QL MICRO: NORMAL /HPF
BACTERIA UR QL AUTO: NORMAL /HPF
BILIRUB UR QL STRIP: NEGATIVE
CLARITY UR: ABNORMAL
COLOR UR: ABNORMAL
COLOR, POC: NORMAL
GLUCOSE UR STRIP-MCNC: ABNORMAL MG/DL
HGB UR QL STRIP.AUTO: NEGATIVE
KETONES UR STRIP-MCNC: NEGATIVE MG/DL
LEUKOCYTE ESTERASE UR QL STRIP: NEGATIVE
NITRITE UR QL STRIP: POSITIVE
NON-SQ EPI CELLS URNS QL MICRO: NORMAL /HPF
PH UR STRIP.AUTO: 7.5 [PH] (ref 4.5–8)
PROT UR STRIP-MCNC: ABNORMAL MG/DL
RBC #/AREA URNS AUTO: NORMAL /HPF
SP GR UR STRIP.AUTO: 1.01 (ref 1–1.03)
UROBILINOGEN UR QL STRIP.AUTO: 0.2 E.U./DL
WBC #/AREA URNS AUTO: NORMAL /HPF

## 2019-10-01 PROCEDURE — 81001 URINALYSIS AUTO W/SCOPE: CPT

## 2019-10-01 PROCEDURE — 99284 EMERGENCY DEPT VISIT MOD MDM: CPT | Performed by: EMERGENCY MEDICINE

## 2019-10-01 PROCEDURE — 87491 CHLMYD TRACH DNA AMP PROBE: CPT | Performed by: EMERGENCY MEDICINE

## 2019-10-01 PROCEDURE — 99284 EMERGENCY DEPT VISIT MOD MDM: CPT

## 2019-10-01 PROCEDURE — 51798 US URINE CAPACITY MEASURE: CPT

## 2019-10-01 PROCEDURE — 81002 URINALYSIS NONAUTO W/O SCOPE: CPT | Performed by: EMERGENCY MEDICINE

## 2019-10-01 PROCEDURE — 87591 N.GONORRHOEAE DNA AMP PROB: CPT | Performed by: EMERGENCY MEDICINE

## 2019-10-01 RX ORDER — CEPHALEXIN 500 MG/1
500 CAPSULE ORAL 2 TIMES DAILY
Qty: 14 CAPSULE | Refills: 0 | Status: SHIPPED | OUTPATIENT
Start: 2019-10-01 | End: 2019-10-08

## 2019-10-01 NOTE — ED NOTES
Patient ambulatory to bathroom with steady gait at this time      Roderick Scherer RN  10/01/19 6736

## 2019-10-01 NOTE — ED PROVIDER NOTES
History  Chief Complaint   Patient presents with    Abdominal Pain     Pt  reports lower abdominal pain  Pt  reports the pain is right above his penis and it feels hot  Pt  denies N/V/D  This is a 17-year-old male that presents with complaints of suprapubic abdominal pain and dysuria  Patient reports that experiencing symptoms for greater than 1 year, he is followed by Urology, patient states that approximately 2 weeks ago he was evaluated by Urology who placed him on tamsulosin  At that time they told him he did not have any evidence infection that his prostate was "fine "  Patient states that in the past week he has had worsening of his pain  He also reports that he has burning when he urinates  He denies any penile discharge, he denies any hematuria  He denies any radiation of the pain to other areas, he denies any new onset back pain or flank pain  He denies any fever chills nausea vomiting  History provided by:  Patient   used: Yes    Difficulty Urinating   Presenting symptoms: dysuria    Context: during urination    Relieved by:  Nothing  Worsened by:  Nothing  Associated symptoms: abdominal pain    Associated symptoms: no diarrhea, no fever, no flank pain, no hematuria, no nausea and no vomiting    Abdominal pain:     Location:  Suprapubic    Quality: burning      Severity:  Mild    Onset quality:  Gradual    Timing:  Constant    Progression:  Waxing and waning      Prior to Admission Medications   Prescriptions Last Dose Informant Patient Reported?  Taking?   famotidine (PEPCID) 20 mg tablet   No No   Sig: Take 1 tablet (20 mg total) by mouth 2 (two) times a day   ondansetron (ZOFRAN-ODT) 4 mg disintegrating tablet   No No   Sig: Take 1 tablet (4 mg total) by mouth every 6 (six) hours as needed for nausea or vomiting   sucralfate (CARAFATE) 1 g tablet   No No   Sig: Take 1 tablet (1 g total) by mouth 4 (four) times a day   tamsulosin (FLOMAX) 0 4 mg   Yes Yes   Sig: Take 0 4 mg by mouth      Facility-Administered Medications: None       Past Medical History:   Diagnosis Date    No known health problems        Past Surgical History:   Procedure Laterality Date    NO PAST SURGERIES         History reviewed  No pertinent family history  I have reviewed and agree with the history as documented  Social History     Tobacco Use    Smoking status: Never Smoker    Smokeless tobacco: Never Used   Substance Use Topics    Alcohol use: No    Drug use: No        Review of Systems   Constitutional: Negative for chills and fever  HENT: Negative for congestion and sore throat  Eyes: Negative for photophobia and visual disturbance  Respiratory: Negative for cough and shortness of breath  Cardiovascular: Negative for chest pain and leg swelling  Gastrointestinal: Positive for abdominal pain  Negative for blood in stool, diarrhea, nausea and vomiting  Genitourinary: Positive for dysuria  Negative for flank pain and hematuria  Musculoskeletal: Negative for neck pain and neck stiffness  Skin: Negative for rash and wound  Neurological: Negative for weakness and numbness  Psychiatric/Behavioral: Negative for behavioral problems and confusion  All other systems reviewed and are negative  Physical Exam  ED Triage Vitals [10/01/19 1601]   Temperature Pulse Respirations Blood Pressure SpO2   98 1 °F (36 7 °C) 67 18 144/81 96 %      Temp Source Heart Rate Source Patient Position - Orthostatic VS BP Location FiO2 (%)   Oral Monitor Sitting Right arm --      Pain Score       3             Orthostatic Vital Signs  Vitals:    10/01/19 1601 10/01/19 1740   BP: 144/81 128/60   Pulse: 67 61   Patient Position - Orthostatic VS: Sitting Lying       Physical Exam   Constitutional: He appears well-developed  No distress  HENT:   Head: Normocephalic     Right Ear: External ear normal    Left Ear: External ear normal    Nose: Nose normal    Mouth/Throat: Oropharynx is clear and moist  Eyes: Conjunctivae and EOM are normal  Right eye exhibits no discharge  Left eye exhibits no discharge  Neck: Normal range of motion  No tracheal deviation present  Cardiovascular: Normal rate, regular rhythm, normal heart sounds and intact distal pulses  Pulmonary/Chest: Effort normal and breath sounds normal  No stridor  No respiratory distress  He has no wheezes  He has no rales  He exhibits no tenderness  Abdominal: Soft  Bowel sounds are normal  He exhibits no distension  There is tenderness in the suprapubic area  There is no rigidity, no rebound, no guarding and no CVA tenderness  Genitourinary: Testes normal and penis normal  Cremasteric reflex is present  Right testis shows no mass and no tenderness  Left testis shows no mass and no tenderness  No penile erythema or penile tenderness  No discharge found  Musculoskeletal: He exhibits no tenderness  Neurological: He is alert  No cranial nerve deficit or sensory deficit  He exhibits normal muscle tone  Skin: Skin is warm and dry  Capillary refill takes less than 2 seconds  No rash noted  He is not diaphoretic  Psychiatric: His behavior is normal    Nursing note and vitals reviewed  ED Medications  Medications - No data to display    Diagnostic Studies  Results Reviewed     Procedure Component Value Units Date/Time    Urine Microscopic [[id number]] Collected:  10/01/19 1631    Lab Status:  Final result Specimen:  Urine, Clean Catch Updated:  10/01/19 1724     RBC, UA None Seen /hpf      WBC, UA None Seen /hpf      Epithelial Cells None Seen /hpf      Bacteria, UA Occasional /hpf      AMORPH PHOSPATES Moderate /hpf     Chlamydia/GC amplified DNA by PCR [[id number]] Collected:  10/01/19 1638    Lab Status:   In process Specimen:  Urine, Other Updated:  10/01/19 1640    POCT urinalysis dipstick [[id number]]  (Normal) Resulted:  10/01/19 1636    Lab Status:  Final result Specimen:  Urine Updated:  10/01/19 1636     Color, UA orange    ED Urine Macroscopic [[id number]]  (Abnormal) Collected:  10/01/19 1631    Lab Status:  Final result Specimen:  Urine Updated:  10/01/19 1633     Color, UA Orange     Clarity, UA Cloudy     pH, UA 7 5     Leukocytes, UA Negative     Nitrite, UA Positive     Protein, UA 30 (1+) mg/dl      Glucose,  (1/10%) mg/dl      Ketones, UA Negative mg/dl      Urobilinogen, UA 0 2 E U /dl      Bilirubin, UA Negative     Blood, UA Negative     Specific Gravity, UA 1 015    Narrative:       CLINITEK RESULT                 No orders to display         Procedures  Procedures        ED Course  ED Course as of Oct 01 1801   Tue Oct 01, 2019   1736 Will treat for UTI   Nitrite, UA(!): Positive   1736 Bacteria, UA: Occasional                               MDM  Number of Diagnoses or Management Options  Diagnosis management comments: This is a 33-year-old male that presents with complaints of dysuria and suprapubic abdominal pain, this is noted to have a history of BPH is currently taking tamsulosin  Differential likely UTI/cystitis versus urinary retention  Will obtain urinalysis and urine GC chlamydia, will obtain postvoid residual   Patient otherwise unremarkable physical exam with stable vitals no distress        Disposition  Final diagnoses:   Dysuria   Suprapubic abdominal pain   UTI (urinary tract infection)     Time reflects when diagnosis was documented in both MDM as applicable and the Disposition within this note     Time User Action Codes Description Comment    10/1/2019  4:38 PM Tremaine Fields Add [R30 0] Dysuria     10/1/2019  4:38 PM Tremaine Fields Add [R10 2] Suprapubic abdominal pain     10/1/2019  5:40 PM Bi Fields Add [N39 0] UTI (urinary tract infection)     10/1/2019  5:41 PM Bi Fields Modify [R30 0] Dysuria     10/1/2019  5:41 PM Tremaine Fields Modify [N39 0] UTI (urinary tract infection)       ED Disposition     ED Disposition Condition Date/Time Comment    Discharge Stable Tue Oct 1, 2019  4:38  [address] discharge to home/self care  Follow-up Information     Follow up With Specialties Details Why [address]95 [facility] [place] [facility] Schedule an appointment as soon as possible for a visit   [place] [phone]  [address]  [facility] For Urology [place], [address], [facility] [place], [id number] [phone]          Discharge Medication List as of 10/1/2019  5:41 PM      START taking these medications    Details   cephalexin (KEFLEX) 500 mg capsule Take 1 capsule (500 mg total) by mouth 2 (two) times a day for 7 days, Starting Tue 10/1/2019, Until Tue 10/8/2019, Print         CONTINUE these medications which have NOT CHANGED    Details   tamsulosin (FLOMAX) 0 4 mg Take 0 4 mg by mouth, Starting Tue 3/6/2018, Historical Med      famotidine (PEPCID) 20 mg tablet Take 1 tablet (20 mg total) by mouth 2 (two) times a day, Starting Mon 2/25/2019, Print      ondansetron (ZOFRAN-ODT) 4 mg disintegrating tablet Take 1 tablet (4 mg total) by mouth every 6 (six) hours as needed for nausea or vomiting, Starting Wed 1/9/2019, Print      sucralfate (CARAFATE) 1 g tablet Take 1 tablet (1 g total) by mouth 4 (four) times a day, Starting Mon 2/25/2019, Print           No discharge procedures on file  ED Provider  Attending physically available and evaluated [address]  I managed the patient along with the ED Attending      Electronically Signed by         Manny Mercedes MD  10/01/19 0852

## 2019-10-01 NOTE — ED ATTENDING ATTESTATION
10/1/2019  ITiana MD, saw and evaluated the patient  I have discussed the patient with the resident/non-physician practitioner and agree with the resident's/non-physician practitioner's findings, Plan of Care, and MDM as documented in the resident's/non-physician practitioner's note, except where noted  All available labs and Radiology studies were reviewed  I was present for key portions of any procedure(s) performed by the resident/non-physician practitioner and I was immediately available to provide assistance  At this point I agree with the current assessment done in the Emergency Department  I have conducted an independent evaluation of this patient a history and physical is as follows:    55 YO male presents with dysuria, Hx of BPH  Pt has been following with urology, started on Tamsulosin  States symptoms have been present for greater than 1 year, worsening for the last week  States burning with urination and non-radiating suprapubic pain  Pt denies hesitancy  Pt denies CP/SOB/F/C/N/V/D/C, no dysuria, burning on urination or blood in urine  Gen: Pt is in NAD  HEENT: Head is atraumatic, EOM's intact, neck has FROM  Chest: CTAB, non-tender  Heart: RRR  Abdomen: Soft, Mild suprapubic tenderness, no rebound or guarding  Musculoskeletal: FROM in all extremities  Skin: No rash, no ecchymosis  Neuro: Awake, alert, oriented x4; Cranial nerves II-XII intact  Psych: Normal affect    MDM - Check urine for infection, STI         ED Course         Critical Care Time  Procedures

## 2019-10-01 NOTE — ED NOTES
Seen and assessed by ED provider, independent of nursing care        Christel Bernheim, BRUCE  10/01/19 7566

## 2019-10-02 LAB
C TRACH DNA SPEC QL NAA+PROBE: NEGATIVE
N GONORRHOEA DNA SPEC QL NAA+PROBE: NEGATIVE

## 2021-10-13 ENCOUNTER — HOSPITAL ENCOUNTER (EMERGENCY)
Facility: HOSPITAL | Age: 51
Discharge: HOME | End: 2021-10-13
Attending: EMERGENCY MEDICINE
Payer: COMMERCIAL

## 2021-10-13 VITALS
DIASTOLIC BLOOD PRESSURE: 85 MMHG | SYSTOLIC BLOOD PRESSURE: 143 MMHG | BODY MASS INDEX: 25.34 KG/M2 | TEMPERATURE: 98.3 F | HEART RATE: 67 BPM | HEIGHT: 65 IN | WEIGHT: 152.12 LBS | OXYGEN SATURATION: 97 % | RESPIRATION RATE: 14 BRPM

## 2021-10-13 DIAGNOSIS — R35.0 URINARY FREQUENCY: Primary | ICD-10-CM

## 2021-10-13 LAB
BILIRUB UR QL STRIP: NEGATIVE
CLARITY UR: CLEAR
COLOR UR: YELLOW
GLUCOSE UR STRIP-MCNC: NEGATIVE MG/DL
HGB UR QL STRIP.AUTO: NEGATIVE
KETONES UR STRIP-MCNC: NEGATIVE MG/DL
LEUKOCYTE ESTERASE UR QL STRIP: NEGATIVE
NITRITE UR QL STRIP: NEGATIVE
PH UR STRIP.AUTO: 6.5 [PH] (ref 4.5–8)
PROT UR STRIP-MCNC: NEGATIVE MG/DL
SP GR UR STRIP.AUTO: 1.02 (ref 1–1.03)
UROBILINOGEN UR QL STRIP.AUTO: 0.2 E.U./DL

## 2021-10-13 PROCEDURE — 81003 URINALYSIS AUTO W/O SCOPE: CPT

## 2021-10-13 PROCEDURE — 99284 EMERGENCY DEPT VISIT MOD MDM: CPT | Performed by: EMERGENCY MEDICINE

## 2021-10-13 PROCEDURE — 99283 EMERGENCY DEPT VISIT LOW MDM: CPT

## 2021-10-13 RX ORDER — TAMSULOSIN HYDROCHLORIDE 0.4 MG/1
0.4 CAPSULE ORAL
Qty: 30 CAPSULE | Refills: 0 | Status: SHIPPED | OUTPATIENT
Start: 2021-10-13

## 2022-08-14 ENCOUNTER — HOSPITAL ENCOUNTER (EMERGENCY)
Facility: HOSPITAL | Age: 52
Discharge: HOME | End: 2022-08-14
Attending: EMERGENCY MEDICINE
Payer: COMMERCIAL

## 2022-08-14 ENCOUNTER — APPOINTMENT (EMERGENCY)
Dept: CT IMAGING | Facility: HOSPITAL | Age: 52
End: 2022-08-14
Payer: COMMERCIAL

## 2022-08-14 VITALS
SYSTOLIC BLOOD PRESSURE: 157 MMHG | BODY MASS INDEX: 24.98 KG/M2 | RESPIRATION RATE: 16 BRPM | OXYGEN SATURATION: 100 % | TEMPERATURE: 98.3 F | WEIGHT: 149.91 LBS | HEART RATE: 80 BPM | HEIGHT: 65 IN | DIASTOLIC BLOOD PRESSURE: 92 MMHG

## 2022-08-14 DIAGNOSIS — R42 DIZZINESS: Primary | ICD-10-CM

## 2022-08-14 LAB
ALBUMIN SERPL BCP-MCNC: 3.9 G/DL (ref 3.5–5)
ALP SERPL-CCNC: 55 U/L (ref 46–116)
ALT SERPL W P-5'-P-CCNC: 47 U/L (ref 12–78)
ANION GAP SERPL CALCULATED.3IONS-SCNC: 5 MMOL/L (ref 4–13)
AST SERPL W P-5'-P-CCNC: 31 U/L (ref 5–45)
BASOPHILS # BLD AUTO: 0.03 THOUSANDS/ΜL (ref 0–0.1)
BASOPHILS NFR BLD AUTO: 0 % (ref 0–1)
BILIRUB SERPL-MCNC: 0.42 MG/DL (ref 0.2–1)
BUN SERPL-MCNC: 32 MG/DL (ref 5–25)
CALCIUM SERPL-MCNC: 9.1 MG/DL (ref 8.3–10.1)
CARDIAC TROPONIN I PNL SERPL HS: 3 NG/L
CHLORIDE SERPL-SCNC: 103 MMOL/L (ref 96–108)
CO2 SERPL-SCNC: 31 MMOL/L (ref 21–32)
CREAT SERPL-MCNC: 1.06 MG/DL (ref 0.6–1.3)
EOSINOPHIL # BLD AUTO: 0.14 THOUSAND/ΜL (ref 0–0.61)
EOSINOPHIL NFR BLD AUTO: 2 % (ref 0–6)
ERYTHROCYTE [DISTWIDTH] IN BLOOD BY AUTOMATED COUNT: 11.9 % (ref 11.6–15.1)
GFR SERPL CREATININE-BSD FRML MDRD: 81 ML/MIN/1.73SQ M
GLUCOSE SERPL-MCNC: 89 MG/DL (ref 65–140)
HCT VFR BLD AUTO: 44.4 % (ref 36.5–49.3)
HGB BLD-MCNC: 14.8 G/DL (ref 12–17)
IMM GRANULOCYTES # BLD AUTO: 0.01 THOUSAND/UL (ref 0–0.2)
IMM GRANULOCYTES NFR BLD AUTO: 0 % (ref 0–2)
LYMPHOCYTES # BLD AUTO: 2.06 THOUSANDS/ΜL (ref 0.6–4.47)
LYMPHOCYTES NFR BLD AUTO: 29 % (ref 14–44)
MAGNESIUM SERPL-MCNC: 2.4 MG/DL (ref 1.6–2.6)
MCH RBC QN AUTO: 31.4 PG (ref 26.8–34.3)
MCHC RBC AUTO-ENTMCNC: 33.3 G/DL (ref 31.4–37.4)
MCV RBC AUTO: 94 FL (ref 82–98)
MONOCYTES # BLD AUTO: 0.44 THOUSAND/ΜL (ref 0.17–1.22)
MONOCYTES NFR BLD AUTO: 6 % (ref 4–12)
NEUTROPHILS # BLD AUTO: 4.38 THOUSANDS/ΜL (ref 1.85–7.62)
NEUTS SEG NFR BLD AUTO: 63 % (ref 43–75)
NRBC BLD AUTO-RTO: 0 /100 WBCS
NT-PROBNP SERPL-MCNC: 14 PG/ML
PLATELET # BLD AUTO: 202 THOUSANDS/UL (ref 149–390)
PMV BLD AUTO: 9.5 FL (ref 8.9–12.7)
POTASSIUM SERPL-SCNC: 4 MMOL/L (ref 3.5–5.3)
PROT SERPL-MCNC: 7.7 G/DL (ref 6.4–8.4)
RBC # BLD AUTO: 4.71 MILLION/UL (ref 3.88–5.62)
SODIUM SERPL-SCNC: 139 MMOL/L (ref 135–147)
WBC # BLD AUTO: 7.06 THOUSAND/UL (ref 4.31–10.16)

## 2022-08-14 PROCEDURE — 70450 CT HEAD/BRAIN W/O DYE: CPT

## 2022-08-14 PROCEDURE — 93005 ELECTROCARDIOGRAM TRACING: CPT

## 2022-08-14 PROCEDURE — 99284 EMERGENCY DEPT VISIT MOD MDM: CPT | Performed by: EMERGENCY MEDICINE

## 2022-08-14 PROCEDURE — 36415 COLL VENOUS BLD VENIPUNCTURE: CPT | Performed by: EMERGENCY MEDICINE

## 2022-08-14 PROCEDURE — 80053 COMPREHEN METABOLIC PANEL: CPT | Performed by: EMERGENCY MEDICINE

## 2022-08-14 PROCEDURE — G1004 CDSM NDSC: HCPCS

## 2022-08-14 PROCEDURE — 83880 ASSAY OF NATRIURETIC PEPTIDE: CPT | Performed by: EMERGENCY MEDICINE

## 2022-08-14 PROCEDURE — 83735 ASSAY OF MAGNESIUM: CPT | Performed by: EMERGENCY MEDICINE

## 2022-08-14 PROCEDURE — 84484 ASSAY OF TROPONIN QUANT: CPT | Performed by: EMERGENCY MEDICINE

## 2022-08-14 PROCEDURE — 96360 HYDRATION IV INFUSION INIT: CPT

## 2022-08-14 PROCEDURE — 99284 EMERGENCY DEPT VISIT MOD MDM: CPT

## 2022-08-14 PROCEDURE — 85025 COMPLETE CBC W/AUTO DIFF WBC: CPT | Performed by: EMERGENCY MEDICINE

## 2022-08-14 RX ORDER — MECLIZINE HYDROCHLORIDE 25 MG/1
25 TABLET ORAL ONCE
Status: COMPLETED | OUTPATIENT
Start: 2022-08-14 | End: 2022-08-14

## 2022-08-14 RX ORDER — MECLIZINE HYDROCHLORIDE 25 MG/1
25 TABLET ORAL EVERY 8 HOURS PRN
Qty: 9 TABLET | Refills: 0 | Status: SHIPPED | OUTPATIENT
Start: 2022-08-14 | End: 2022-08-17

## 2022-08-14 RX ADMIN — MECLIZINE HYDROCHLORIDE 25 MG: 25 TABLET ORAL at 16:19

## 2022-08-14 RX ADMIN — SODIUM CHLORIDE 1000 ML: 0.9 INJECTION, SOLUTION INTRAVENOUS at 16:20

## 2022-08-14 NOTE — Clinical Note
Carlos Alberto Hall was seen and treated in our emergency department on 8/14/2022  Diagnosis:     Shantal Corbettroger    He may return on this date: 08/16/2022         If you have any questions or concerns, please don't hesitate to call        Srini Paz DO    ______________________________           _______________          _______________  Hospital Representative                              Date                                Time

## 2022-08-14 NOTE — ED PROVIDER NOTES
History  Chief Complaint   Patient presents with    Dizziness     Reports dizziness for the past 2 weeks  Denies SOB/CP  C/o slight headache      Patient is a 53-year-old male otherwise healthy coming in today for complaints of dizziness  He states he does have high cholesterol only  Reports this started over 2 weeks ago nontraumatic in nature  He reports that it feels like he is falling backwards and spinning  He has some mild headache but no nauseous, vomiting, diarrhea  He denies any fevers or chills  He denies any amaurosis fugax, visual changes, tinnitus  He denies any chest pain, palpitations or syncope  He denies any numbness or tingling throughout the bilateral upper extremities or lower extremities  He denies any weakness throughout the bilateral upper extremities or lower extremities  He has had no recent colds or illnesses  History provided by:  Patient   used: No    Dizziness  Quality:  Head spinning  Severity:  Mild  Onset quality:  Gradual  Timing:  Intermittent  Progression:  Waxing and waning  Chronicity:  New  Context: not when bending over, not with bowel movement, not with ear pain, not with eye movement, not with head movement, not with inactivity, not with loss of consciousness, not with medication, not with physical activity, not when standing up and not when urinating    Relieved by:  None tried  Worsened by:  Nothing  Ineffective treatments:  None tried  Associated symptoms: no blood in stool, no chest pain, no diarrhea, no headaches, no hearing loss, no nausea, no palpitations, no shortness of breath, no syncope, no tinnitus, no vision changes, no vomiting and no weakness    Risk factors: no anemia, no heart disease, no hx of stroke, no hx of vertigo, no Meniere's disease, no multiple medications and no new medications        Prior to Admission Medications   Prescriptions Last Dose Informant Patient Reported?  Taking?   famotidine (PEPCID) 20 mg tablet   No No   Sig: Take 1 tablet (20 mg total) by mouth 2 (two) times a day   ondansetron (ZOFRAN-ODT) 4 mg disintegrating tablet   No No   Sig: Take 1 tablet (4 mg total) by mouth every 6 (six) hours as needed for nausea or vomiting   sucralfate (CARAFATE) 1 g tablet   No No   Sig: Take 1 tablet (1 g total) by mouth 4 (four) times a day   tamsulosin (FLOMAX) 0 4 mg   Yes No   Sig: Take 0 4 mg by mouth   tamsulosin (FLOMAX) 0 4 mg   No No   Sig: Take 1 capsule (0 4 mg total) by mouth daily with dinner      Facility-Administered Medications: None       Past Medical History:   Diagnosis Date    HLD (hyperlipidemia)     No known health problems        History reviewed  No pertinent surgical history  History reviewed  No pertinent family history  I have reviewed and agree with the history as documented  E-Cigarette/Vaping     E-Cigarette/Vaping Substances     Social History     Tobacco Use    Smoking status: Never Smoker    Smokeless tobacco: Never Used   Substance Use Topics    Alcohol use: No    Drug use: No       Review of Systems   Constitutional: Negative  Negative for chills and fever  HENT: Negative  Negative for ear pain, hearing loss, sore throat and tinnitus  Eyes: Negative  Negative for pain and visual disturbance  Respiratory: Negative  Negative for cough and shortness of breath  Cardiovascular: Negative  Negative for chest pain, palpitations and syncope  Gastrointestinal: Negative  Negative for abdominal pain, blood in stool, diarrhea, nausea and vomiting  Genitourinary: Negative  Negative for dysuria and hematuria  Musculoskeletal: Negative  Negative for arthralgias and back pain  Skin: Negative  Negative for color change and rash  Neurological: Positive for dizziness  Negative for seizures, syncope, weakness and headaches  Psychiatric/Behavioral: Negative  All other systems reviewed and are negative  Physical Exam  Physical Exam  Vitals and nursing note reviewed  Constitutional:       Appearance: He is well-developed  HENT:      Head: Normocephalic and atraumatic  Comments: Patient maintaining airway and secretions  No stridor   No brawniness under tongue  Mouth/Throat:      Mouth: Mucous membranes are moist    Eyes:      Extraocular Movements: Extraocular movements intact  Conjunctiva/sclera: Conjunctivae normal       Pupils: Pupils are equal, round, and reactive to light  Cardiovascular:      Rate and Rhythm: Normal rate and regular rhythm  Pulses:           Radial pulses are 2+ on the right side and 2+ on the left side  Dorsalis pedis pulses are 2+ on the right side and 2+ on the left side  Heart sounds: Normal heart sounds, S1 normal and S2 normal  No murmur heard  Pulmonary:      Effort: Pulmonary effort is normal  No respiratory distress  Breath sounds: Normal breath sounds  Abdominal:      Palpations: Abdomen is soft  Tenderness: There is no abdominal tenderness  Musculoskeletal:      Cervical back: Neck supple  Right lower leg: No edema  Left lower leg: No edema  Skin:     General: Skin is warm and dry  Neurological:      General: No focal deficit present  Mental Status: He is alert and oriented to person, place, and time  GCS: GCS eye subscore is 4  GCS verbal subscore is 5  GCS motor subscore is 6  Cranial Nerves: Cranial nerves are intact  Sensory: Sensation is intact  Motor: Motor function is intact  Coordination: Coordination is intact  Gait: Gait is intact  Comments: No slurred speech  No facial asymmetry  No ataxia  No tongue deviation  Negative pronator drift   No nystagmus          Vital Signs  ED Triage Vitals   Temperature Pulse Respirations Blood Pressure SpO2   08/14/22 1433 08/14/22 1433 08/14/22 1433 08/14/22 1435 08/14/22 1433   98 3 °F (36 8 °C) 77 16 140/78 99 %      Temp Source Heart Rate Source Patient Position - Orthostatic VS BP Location FiO2 (%)   08/14/22 1433 08/14/22 1433 08/14/22 1433 08/14/22 1433 --   Oral Monitor Sitting Right arm       Pain Score       08/14/22 1627       No Pain           Vitals:    08/14/22 1433 08/14/22 1435 08/14/22 1627 08/14/22 1818   BP:  140/78 153/87 157/92   Pulse: 77  65 80   Patient Position - Orthostatic VS: Sitting  Lying Lying         Visual Acuity      ED Medications  Medications   sodium chloride 0 9 % bolus 1,000 mL (0 mL Intravenous Stopped 8/14/22 1725)   meclizine (ANTIVERT) tablet 25 mg (25 mg Oral Given 8/14/22 1619)       Diagnostic Studies  Results Reviewed     Procedure Component Value Units Date/Time    NT-BNP PRO [[id number]]  (Normal) Collected: 08/14/22 1620    Lab Status: Final result Specimen: Blood from Arm, Right Updated: 08/14/22 1651     NT-proBNP 14 pg/mL     Magnesium [[id number]]  (Normal) Collected: 08/14/22 1620    Lab Status: Final result Specimen: Blood from Arm, Right Updated: 08/14/22 1651     Magnesium 2 4 mg/dL     HS Troponin 0hr (reflex protocol) [[id number]]  (Normal) Collected: 08/14/22 1620    Lab Status: Final result Specimen: Blood from Arm, Right Updated: 08/14/22 1648     hs TnI 0hr 3 ng/L     Comprehensive metabolic panel [[id number]]  (Abnormal) Collected: 08/14/22 1620    Lab Status: Final result Specimen: Blood from Arm, Right Updated: 08/14/22 1644     Sodium 139 mmol/L      Potassium 4 0 mmol/L      Chloride 103 mmol/L      CO2 31 mmol/L      ANION GAP 5 mmol/L      BUN 32 mg/dL      Creatinine 1 06 mg/dL      Glucose 89 mg/dL      Calcium 9 1 mg/dL      AST 31 U/L      ALT 47 U/L      Alkaline Phosphatase 55 U/L      Total Protein 7 7 g/dL      Albumin 3 9 g/dL      Total Bilirubin 0 42 mg/dL      eGFR 81 ml/min/1 73sq m     Narrative:      [place] guidelines for Chronic Kidney Disease (CKD):     Stage 1 with normal or high GFR (GFR > 90 mL/min/1 73 square meters)    Stage 2 Mild CKD (GFR = 60-89 mL/min/1 73 square meters)   Stage 3A Moderate CKD (GFR = 45-59 mL/min/1 73 square meters)    Stage 3B Moderate CKD (GFR = 30-44 mL/min/1 73 square meters)    Stage 4 Severe CKD (GFR = 15-29 mL/min/1 73 square meters)    Stage 5 End Stage CKD (GFR <15 mL/min/1 73 square meters)  Note: GFR calculation is accurate only with a steady state creatinine    CBC and differential [[id number]] Collected: 08/14/22 1620    Lab Status: Final result Specimen: Blood from Arm, Right Updated: 08/14/22 1627     WBC 7 06 Thousand/uL      RBC 4 71 Million/uL      Hemoglobin 14 8 g/dL      Hematocrit 44 4 %      MCV 94 fL      MCH 31 4 pg      MCHC 33 3 g/dL      RDW 11 9 %      MPV 9 5 fL      Platelets 283 Thousands/uL      nRBC 0 /100 WBCs      Neutrophils Relative 63 %      Immat GRANS % 0 %      Lymphocytes Relative 29 %      Monocytes Relative 6 %      Eosinophils Relative 2 %      Basophils Relative 0 %      Neutrophils Absolute 4 38 Thousands/µL      Immature Grans Absolute 0 01 Thousand/uL      Lymphocytes Absolute 2 06 Thousands/µL      Monocytes Absolute 0 44 Thousand/µL      Eosinophils Absolute 0 14 Thousand/µL      Basophils Absolute 0 03 Thousands/µL                  CT head without contrast   Final Result by Heather Cisse MD (08/14 1813)      No acute intracranial abnormality  Workstation performed: [id number]                    Procedures  Procedures         ED Course  ED Course as of 08/14/22 1839   Charity Caballero Aug 14, 2022   9733 Patient is a 61-year-old male coming in today with complaints of dizziness  On exam is well-appearing in no acute distress  Will check EKG, CT head, CBC CMP chart Mag as well as give IV fluids and medically      He remains neuro intact with NIH 0    Portions of the record may have been created with voice recognition software  Occasional wrong word or "sound a like" substitutions may have occurred due to the inherent limitations of voice recognition software   Read the chart carefully and recognize, using context, where substitutions have occurred  1652 Patient's labs are stable no evidence of end-organ damage  Troponin 3 with heart score less than 3  Patient's symptoms have been ongoing for several weeks  Pending CT head    1816 CT head negative             HEART Risk Score    Flowsheet Row Most Recent Value   Heart Score Risk Calculator    History 0 Filed at: 08/14/2022 1651   ECG 1 Filed at: 08/14/2022 1651   Age 1 Filed at: 08/14/2022 1651   Risk Factors 1 Filed at: 08/14/2022 1651   Troponin 0 Filed at: 08/14/2022 1651   HEART Score 3 Filed at: 08/14/2022 R [place] [address]           Stroke Assessment     Row Name 08/14/22 1600             NIH Stroke Scale    Interval Baseline      Level of Consciousness (1a ) 0      LOC Questions (1b ) 0      LOC Commands (1c ) 0      Best Gaze (2 ) 0      Visual (3 ) 0      Facial Palsy (4 ) 0      Motor Arm, Left (5a ) 0      Motor Arm, Right (5b ) 0      Motor Leg, Left (6a ) 0      Motor Leg, Right (6b ) 0      Limb Ataxia (7 ) 0      Sensory (8 ) 0      Best Language (9 ) 0      Dysarthria (10 ) 0      Extinction and Inattention (11 ) (Formerly Neglect) 0      Total 0              Flowsheet Row Most Recent Value   Thrombolytic Decision Options    Thrombolytic Decision Patient not a candidate  Patient is not a candidate options Unclear time of onset outside appropriate time window  SBIRT 20yo+    Flowsheet Row Most Recent Value   SBIRT (23 yo +)    In order to provide better care to our patients, we are screening all of our patients for alcohol and drug use  Would it be okay to ask you these screening questions?  No Filed at: 08/14/2022 1549                    MDM  Number of Diagnoses or Management Options  Diagnosis management comments:     EKG INTERPRETATION @ 1646  RHYTHM:  Sinus Sanjay at 50 beats per minute  AXIS:  Normal axis  INTERVALS:  LA interval measured at 166 milliseconds  QRS COMPLEX:  QRS measured at 106 milliseconds  ST SEGMENT:  Nonspecific ST segment changes  Diffuse artifact  QT INTERVAL:  QTC measured at 401 milliseconds  COMPARED WITH PRIOR no acute change from February 2019  Interpretation by Kristin Colvin DO    Differential diagnosis includes but not limited to: BPPV, Menière's, labyrinthitis, CVA, TIA, arrhthymias, ANNETTA, electrolyte dysfunction, orthostatic hypotension, dehydration, medication reaction, OM, vestibular neuritis  Amount and/or Complexity of Data Reviewed  Clinical lab tests: ordered and reviewed  Tests in the radiology section of CPT®: reviewed and ordered  Tests in the medicine section of CPT®: ordered and reviewed  Review and summarize past medical records: yes  Independent visualization of images, tracings, or specimens: yes        Disposition  Final diagnoses:   Dizziness     Time reflects when diagnosis was documented in both MDM as applicable and the Disposition within this note     Time User Action Codes Description Comment    8/14/2022  6:17 PM Liborio Steele [[email or web address]] Dizziness       ED Disposition     ED Disposition   Discharge    Condition   Stable    Date/Time   Sun Aug 14, 2022  6:17 PM    Shannakaryn 76 discharge to home/self care                 Follow-up Information     Follow up With Specialties Details Why Contact Info Additional [facility] Schedule an appointment as soon as possible for a visit in 1 week  [address], [address] [facility] [id number]  [address], [address], [address] [place], [place], [address]          Discharge Medication List as of 8/14/2022  6:18 PM      START taking these medications    Details   meclizine (ANTIVERT) 25 mg tablet Take 1 tablet (25 mg total) by mouth every 8 (eight) hours as needed for dizziness for up to 3 days, Starting Sun 8/14/2022, Until Wed 8/17/2022 at 2359, Normal         CONTINUE these medications which have NOT CHANGED    Details   famotidine (PEPCID) 20 mg tablet Take 1 tablet (20 mg total) by mouth 2 (two) times a day, Starting Mon 2/25/2019, Print      ondansetron (ZOFRAN-ODT) 4 mg disintegrating tablet Take 1 tablet (4 mg total) by mouth every 6 (six) hours as needed for nausea or vomiting, Starting Wed 1/9/2019, Print      sucralfate (CARAFATE) 1 g tablet Take 1 tablet (1 g total) by mouth 4 (four) times a day, Starting Mon 2/25/2019, Print      !! tamsulosin (FLOMAX) 0 4 mg Take 0 4 mg by mouth, Starting Tue 3/6/2018, Historical Med      !! tamsulosin (FLOMAX) 0 4 mg Take 1 capsule (0 4 mg total) by mouth daily with dinner, Starting Wed 10/13/2021, Print       !! - Potential duplicate medications found  Please discuss with provider  No discharge procedures on file      PDMP Review     None          ED Provider  Electronically Signed by           Faina Funes DO  08/14/22 7214

## 2022-08-15 LAB
ATRIAL RATE: 51 BPM
P AXIS: 47 DEGREES
PR INTERVAL: 166 MS
QRS AXIS: 38 DEGREES
QRSD INTERVAL: 106 MS
QT INTERVAL: 436 MS
QTC INTERVAL: 401 MS
T WAVE AXIS: 41 DEGREES
VENTRICULAR RATE: 51 BPM

## 2022-08-15 PROCEDURE — 93010 ELECTROCARDIOGRAM REPORT: CPT

## 2023-01-27 ENCOUNTER — HOSPITAL ENCOUNTER (EMERGENCY)
Facility: HOSPITAL | Age: 53
Discharge: HOME | End: 2023-01-27
Attending: EMERGENCY MEDICINE

## 2023-01-27 ENCOUNTER — APPOINTMENT (EMERGENCY)
Dept: CT IMAGING | Facility: HOSPITAL | Age: 53
End: 2023-01-27

## 2023-01-27 VITALS
TEMPERATURE: 98.6 F | SYSTOLIC BLOOD PRESSURE: 137 MMHG | HEART RATE: 58 BPM | DIASTOLIC BLOOD PRESSURE: 84 MMHG | OXYGEN SATURATION: 99 % | RESPIRATION RATE: 17 BRPM

## 2023-01-27 DIAGNOSIS — I86.1 VARICOCELE: Primary | ICD-10-CM

## 2023-01-27 LAB
ALBUMIN SERPL BCP-MCNC: 4.6 G/DL (ref 3.5–5)
ALP SERPL-CCNC: 47 U/L (ref 34–104)
ALT SERPL W P-5'-P-CCNC: 26 U/L (ref 7–52)
ANION GAP SERPL CALCULATED.3IONS-SCNC: 5 MMOL/L (ref 4–13)
AST SERPL W P-5'-P-CCNC: 23 U/L (ref 13–39)
BACTERIA UR QL AUTO: ABNORMAL /HPF
BASOPHILS # BLD AUTO: 0.04 THOUSANDS/ÂΜL (ref 0–0.1)
BASOPHILS NFR BLD AUTO: 1 % (ref 0–1)
BILIRUB SERPL-MCNC: 0.76 MG/DL (ref 0.2–1)
BILIRUB UR QL STRIP: NEGATIVE
BUN SERPL-MCNC: 15 MG/DL (ref 5–25)
CALCIUM SERPL-MCNC: 9.4 MG/DL (ref 8.4–10.2)
CHLORIDE SERPL-SCNC: 103 MMOL/L (ref 96–108)
CLARITY UR: CLEAR
CO2 SERPL-SCNC: 30 MMOL/L (ref 21–32)
COLOR UR: YELLOW
CREAT SERPL-MCNC: 0.94 MG/DL (ref 0.6–1.3)
EOSINOPHIL # BLD AUTO: 0.14 THOUSAND/ÂΜL (ref 0–0.61)
EOSINOPHIL NFR BLD AUTO: 2 % (ref 0–6)
ERYTHROCYTE [DISTWIDTH] IN BLOOD BY AUTOMATED COUNT: 12.1 % (ref 11.6–15.1)
GFR SERPL CREATININE-BSD FRML MDRD: 94 ML/MIN/1.73SQ M
GLUCOSE SERPL-MCNC: 98 MG/DL (ref 65–140)
GLUCOSE UR STRIP-MCNC: NEGATIVE MG/DL
HCT VFR BLD AUTO: 44 % (ref 36.5–49.3)
HGB BLD-MCNC: 14.8 G/DL (ref 12–17)
HGB UR QL STRIP.AUTO: ABNORMAL
IMM GRANULOCYTES # BLD AUTO: 0.01 THOUSAND/UL (ref 0–0.2)
IMM GRANULOCYTES NFR BLD AUTO: 0 % (ref 0–2)
KETONES UR STRIP-MCNC: NEGATIVE MG/DL
LEUKOCYTE ESTERASE UR QL STRIP: NEGATIVE
LIPASE SERPL-CCNC: 24 U/L (ref 11–82)
LYMPHOCYTES # BLD AUTO: 2.59 THOUSANDS/ÂΜL (ref 0.6–4.47)
LYMPHOCYTES NFR BLD AUTO: 40 % (ref 14–44)
MCH RBC QN AUTO: 31.2 PG (ref 26.8–34.3)
MCHC RBC AUTO-ENTMCNC: 33.6 G/DL (ref 31.4–37.4)
MCV RBC AUTO: 93 FL (ref 82–98)
MONOCYTES # BLD AUTO: 0.41 THOUSAND/ÂΜL (ref 0.17–1.22)
MONOCYTES NFR BLD AUTO: 6 % (ref 4–12)
NEUTROPHILS # BLD AUTO: 3.31 THOUSANDS/ÂΜL (ref 1.85–7.62)
NEUTS SEG NFR BLD AUTO: 51 % (ref 43–75)
NITRITE UR QL STRIP: NEGATIVE
NON-SQ EPI CELLS URNS QL MICRO: ABNORMAL /HPF
NRBC BLD AUTO-RTO: 0 /100 WBCS
PH UR STRIP.AUTO: 6 [PH] (ref 4.5–8)
PLATELET # BLD AUTO: 225 THOUSANDS/UL (ref 149–390)
PMV BLD AUTO: 9.8 FL (ref 8.9–12.7)
POTASSIUM SERPL-SCNC: 4.3 MMOL/L (ref 3.5–5.3)
PROT SERPL-MCNC: 7.5 G/DL (ref 6.4–8.4)
PROT UR STRIP-MCNC: NEGATIVE MG/DL
RBC # BLD AUTO: 4.75 MILLION/UL (ref 3.88–5.62)
RBC #/AREA URNS AUTO: ABNORMAL /HPF
SODIUM SERPL-SCNC: 138 MMOL/L (ref 135–147)
SP GR UR STRIP.AUTO: 1.01 (ref 1–1.03)
UROBILINOGEN UR QL STRIP.AUTO: 0.2 E.U./DL
WBC # BLD AUTO: 6.5 THOUSAND/UL (ref 4.31–10.16)
WBC #/AREA URNS AUTO: ABNORMAL /HPF

## 2023-01-27 RX ORDER — KETOROLAC TROMETHAMINE 30 MG/ML
15 INJECTION, SOLUTION INTRAMUSCULAR; INTRAVENOUS ONCE
Status: DISCONTINUED | OUTPATIENT
Start: 2023-01-27 | End: 2023-01-27 | Stop reason: HOSPADM

## 2023-01-27 RX ADMIN — IOHEXOL 100 ML: 350 INJECTION, SOLUTION INTRAVENOUS at 19:43

## 2023-01-27 NOTE — Clinical Note
Nita Garcia was seen and treated in our emergency department on 1/27/2023  No restrictions            Diagnosis:     Rosalinda Rivera  may return to work on return date  He may return on this date: 01/29/2023         If you have any questions or concerns, please don't hesitate to call        Mychal Martinez MD    ______________________________           _______________          _______________  Hospital Representative                              Date                                Time

## 2023-01-28 NOTE — ED PROVIDER NOTES
History  Chief Complaint   Patient presents with   • Abdominal Pain     LLQ abd pain for a couple days  Denies n/v/d  History provided by:  Patient  Abdominal Pain  Pain location:  LLQ  Pain quality: pressure and shooting    Pain radiation: L testicle  Pain severity:  Moderate  Onset quality:  Gradual  Duration: several months worsening over the past several days  Timing:  Constant  Progression:  Worsening  Chronicity:  Chronic  Relieved by:  Nothing  Worsened by:  Nothing  Associated symptoms: no chest pain, no constipation, no diarrhea, no dysuria, no fatigue, no fever, no hematuria, no nausea, no shortness of breath, no sore throat and no vomiting        Prior to Admission Medications   Prescriptions Last Dose Informant Patient Reported? Taking?   famotidine (PEPCID) 20 mg tablet   No No   Sig: Take 1 tablet (20 mg total) by mouth 2 (two) times a day   meclizine (ANTIVERT) 25 mg tablet   No No   Sig: Take 1 tablet (25 mg total) by mouth every 8 (eight) hours as needed for dizziness for up to 3 days   ondansetron (ZOFRAN-ODT) 4 mg disintegrating tablet   No No   Sig: Take 1 tablet (4 mg total) by mouth every 6 (six) hours as needed for nausea or vomiting   sucralfate (CARAFATE) 1 g tablet   No No   Sig: Take 1 tablet (1 g total) by mouth 4 (four) times a day   tamsulosin (FLOMAX) 0 4 mg   Yes No   Sig: Take 0 4 mg by mouth   tamsulosin (FLOMAX) 0 4 mg   No No   Sig: Take 1 capsule (0 4 mg total) by mouth daily with dinner      Facility-Administered Medications: None       Past Medical History:   Diagnosis Date   • HLD (hyperlipidemia)    • No known health problems        History reviewed  No pertinent surgical history  History reviewed  No pertinent family history  I have reviewed and agree with the history as documented      E-Cigarette/Vaping     E-Cigarette/Vaping Substances     Social History     Tobacco Use   • Smoking status: Never   • Smokeless tobacco: Never   Substance Use Topics   • Alcohol use: No   • Drug use: No       Review of Systems   Constitutional: Negative for activity change, appetite change, fatigue and fever  HENT: Negative for congestion, dental problem, ear pain, rhinorrhea and sore throat  Eyes: Negative for pain and redness  Respiratory: Negative for chest tightness, shortness of breath and wheezing  Cardiovascular: Negative for chest pain and palpitations  Gastrointestinal: Positive for abdominal pain  Negative for blood in stool, constipation, diarrhea, nausea and vomiting  Endocrine: Negative for cold intolerance and heat intolerance  Genitourinary: Positive for penile pain and testicular pain  Negative for dysuria, frequency, hematuria, penile discharge, penile swelling and scrotal swelling  Musculoskeletal: Negative for arthralgias and myalgias  Skin: Negative for color change, pallor and rash  Neurological: Negative for weakness and numbness  Hematological: Does not bruise/bleed easily  Psychiatric/Behavioral: Negative for agitation, hallucinations and suicidal ideas  Physical Exam  Physical Exam  Vitals and nursing note reviewed  Constitutional:       Appearance: He is well-developed  HENT:      Mouth/Throat:      Pharynx: No pharyngeal swelling  Eyes:      General: No scleral icterus  Cardiovascular:      Rate and Rhythm: Normal rate and regular rhythm  Abdominal:      Palpations: Abdomen is soft  Tenderness: There is abdominal tenderness in the left lower quadrant  There is no right CVA tenderness or left CVA tenderness  Hernia: No hernia is present  There is no hernia in the umbilical area  Genitourinary:     Penis: Normal        Testes: Normal  Cremasteric reflex is present  Comments: Male nurse geraldo pantoja present as chaperone  Neurological:      Mental Status: He is alert           Vital Signs  ED Triage Vitals   Temperature Pulse Respirations Blood Pressure SpO2   01/27/23 1643 01/27/23 1643 01/27/23 1644 01/27/23 1643 01/27/23 1643   98 6 °F (37 °C) 67 18 137/76 99 %      Temp Source Heart Rate Source Patient Position - Orthostatic VS BP Location FiO2 (%)   01/27/23 1643 01/27/23 1643 01/27/23 1643 01/27/23 1643 --   Oral Monitor Sitting Right arm       Pain Score       01/27/23 1643       3           Vitals:    01/27/23 1643 01/27/23 1901   BP: 137/76 137/84   Pulse: 67 58   Patient Position - Orthostatic VS: Sitting Sitting         Visual Acuity      ED Medications  Medications   ketorolac (TORADOL) injection 15 mg (0 mg Intravenous Hold 1/27/23 2005)   iohexol (OMNIPAQUE) 350 MG/ML injection (SINGLE-DOSE) 100 mL (100 mL Intravenous Given 1/27/23 1943)       Diagnostic Studies  Results Reviewed     Procedure Component Value Units Date/Time    Comprehensive metabolic panel [[id number]] Collected: 01/27/23 1859    Lab Status: Final result Specimen: Blood from Arm, Right Updated: 01/27/23 1926     Sodium 138 mmol/L      Potassium 4 3 mmol/L      Chloride 103 mmol/L      CO2 30 mmol/L      ANION GAP 5 mmol/L      BUN 15 mg/dL      Creatinine 0 94 mg/dL      Glucose 98 mg/dL      Calcium 9 4 mg/dL      AST 23 U/L      ALT 26 U/L      Alkaline Phosphatase 47 U/L      Total Protein 7 5 g/dL      Albumin 4 6 g/dL      Total Bilirubin 0 76 mg/dL      eGFR 94 ml/min/1 73sq m     Narrative:      Tammie guidelines for Chronic Kidney Disease (CKD):   •  Stage 1 with normal or high GFR (GFR > 90 mL/min/1 73 square meters)  •  Stage 2 Mild CKD (GFR = 60-89 mL/min/1 73 square meters)  •  Stage 3A Moderate CKD (GFR = 45-59 mL/min/1 73 square meters)  •  Stage 3B Moderate CKD (GFR = 30-44 mL/min/1 73 square meters)  •  Stage 4 Severe CKD (GFR = 15-29 mL/min/1 73 square meters)  •  Stage 5 End Stage CKD (GFR <15 mL/min/1 73 square meters)  Note: GFR calculation is accurate only with a steady state creatinine    Lipase [[id number]]  (Normal) Collected: 01/27/23 1859    Lab Status: Final result Specimen: Blood from Arm, Right Updated: 01/27/23 1926     Lipase 24 u/L     Urine Microscopic [[id number]]  (Abnormal) Collected: 01/27/23 1821    Lab Status: Final result Specimen: Urine, Clean Catch Updated: 01/27/23 1922     RBC, UA 0-1 /hpf      WBC, UA None Seen /hpf      Epithelial Cells Occasional /hpf      Bacteria, UA Occasional /hpf     CBC and differential [[id number]] Collected: 01/27/23 1859    Lab Status: Final result Specimen: Blood from Arm, Right Updated: 01/27/23 1912     WBC 6 50 Thousand/uL      RBC 4 75 Million/uL      Hemoglobin 14 8 g/dL      Hematocrit 44 0 %      MCV 93 fL      MCH 31 2 pg      MCHC 33 6 g/dL      RDW 12 1 %      MPV 9 8 fL      Platelets 780 Thousands/uL      nRBC 0 /100 WBCs      Neutrophils Relative 51 %      Immat GRANS % 0 %      Lymphocytes Relative 40 %      Monocytes Relative 6 %      Eosinophils Relative 2 %      Basophils Relative 1 %      Neutrophils Absolute 3 31 Thousands/µL      Immature Grans Absolute 0 01 Thousand/uL      Lymphocytes Absolute 2 59 Thousands/µL      Monocytes Absolute 0 41 Thousand/µL      Eosinophils Absolute 0 14 Thousand/µL      Basophils Absolute 0 04 Thousands/µL     Chlamydia/GC amplified DNA by PCR [[id number]] Collected: 01/27/23 1859    Lab Status: In process Specimen: Urine, Other Updated: 01/27/23 1910    Urine Macroscopic, POC [[id number]]  (Abnormal) Collected: 01/27/23 1821    Lab Status: Final result Specimen: Urine Updated: 01/27/23 1822     Color, UA Yellow     Clarity, UA Clear     pH, UA 6 0     Leukocytes, UA Negative     Nitrite, UA Negative     Protein, UA Negative mg/dl      Glucose, UA Negative mg/dl      Ketones, UA Negative mg/dl      Urobilinogen, UA 0 2 E U /dl      Bilirubin, UA Negative     Occult Blood, UA Trace     Specific [place], UA 1 015    Narrative:      CLINITEK RESULT                 CT abdomen pelvis with contrast   Final Result by Kristen Osorio MD (01/27 2033)      No evidence of acute intra-abdominal or pelvic pathology  Bilateral varicoceles  Scrotal ultrasound may be obtained for further evaluation  Workstation performed: [id number]                    Procedures  Procedures         ED Course  ED Course as of 01/27/23 2113   Charlie Huerta Jan 27, 2023 2048 Work-up reviewed  Patient suffering from bilateral varicoceles which would explain discomfort and swelling in the testicle  Patient is appropriate for discharge to home  He does not require admission and will be followed up with urology  SBIRT 20yo+    Flowsheet Row Most Recent Value   SBIRT (23 yo +)    In order to provide better care to our patients, we are screening all of our patients for alcohol and drug use  Would it be okay to ask you these screening questions? No Filed at: 01/27/2023 1803                    Medical Decision Making  Acute on chronic abd pain radiating for LLQ to L testicle-will check abd labs, urine dip, urine gc/chlamydia, ct a/p to r/o acute pathology, prn pain meds, reasesess    Amount and/or Complexity of Data Reviewed  Labs: ordered  Radiology: ordered  Disposition  Final diagnoses:   Varicocele - bilateral     Time reflects when diagnosis was documented in both MDM as applicable and the Disposition within this note     Time User Action Codes Description Comment    1/27/2023  8:49 PM Jaison Fails Add [I86 1] Varicocele     1/27/2023  8:49 PM Jaison Fails Modify [I86 1] Varicocele bilateral      ED Disposition     ED Disposition   Discharge    Condition   Stable    Date/Time   Fri Jan 27, 2023  8:49 PM    Thierno 76 discharge to home/self care                 Follow-up Information     Follow up With Specialties Details Why Contact Info Additional [address] [facility] Schedule an appointment as soon as possible for a visit in 2 days  [place] [phone]  [phone]  [facility], [address], [place], [address]   [phone]          Discharge Medication List as of 1/27/2023  8:50 PM      CONTINUE these medications which have NOT CHANGED    Details   famotidine (PEPCID) 20 mg tablet Take 1 tablet (20 mg total) by mouth 2 (two) times a day, Starting Mon 2/25/2019, Print      meclizine (ANTIVERT) 25 mg tablet Take 1 tablet (25 mg total) by mouth every 8 (eight) hours as needed for dizziness for up to 3 days, Starting Sun 8/14/2022, Until Wed 8/17/2022 at 2359, Normal      ondansetron (ZOFRAN-ODT) 4 mg disintegrating tablet Take 1 tablet (4 mg total) by mouth every 6 (six) hours as needed for nausea or vomiting, Starting Wed 1/9/2019, Print      sucralfate (CARAFATE) 1 g tablet Take 1 tablet (1 g total) by mouth 4 (four) times a day, Starting Mon 2/25/2019, Print      !! tamsulosin (FLOMAX) 0 4 mg Take 0 4 mg by mouth, Starting Tue 3/6/2018, Historical Med      !! tamsulosin (FLOMAX) 0 4 mg Take 1 capsule (0 4 mg total) by mouth daily with dinner, Starting Wed 10/13/2021, Print       !! - Potential duplicate medications found  Please discuss with provider                PDMP Review     None          ED Provider  Electronically Signed by           Marjean Mohs, MD  01/27/23 1308

## 2023-01-29 LAB
C TRACH DNA SPEC QL NAA+PROBE: NEGATIVE
N GONORRHOEA DNA SPEC QL NAA+PROBE: NEGATIVE

## 2023-06-16 ENCOUNTER — HOSPITAL ENCOUNTER (EMERGENCY)
Facility: HOSPITAL | Age: 53
Discharge: HOME | End: 2023-06-16
Attending: EMERGENCY MEDICINE
Payer: COMMERCIAL

## 2023-06-16 VITALS
HEART RATE: 55 BPM | OXYGEN SATURATION: 98 % | DIASTOLIC BLOOD PRESSURE: 70 MMHG | SYSTOLIC BLOOD PRESSURE: 118 MMHG | RESPIRATION RATE: 16 BRPM | TEMPERATURE: 98.6 F

## 2023-06-16 DIAGNOSIS — I86.1 SCROTAL VARICES: ICD-10-CM

## 2023-06-16 DIAGNOSIS — N50.82 SCROTAL PAIN: Primary | ICD-10-CM

## 2023-06-16 LAB
ATRIAL RATE: 56 BPM
BILIRUB UR QL STRIP: NEGATIVE
CLARITY UR: NORMAL
COLOR UR: NORMAL
GLUCOSE UR STRIP-MCNC: NEGATIVE MG/DL
HGB UR QL STRIP.AUTO: NEGATIVE
HOLD SPECIMEN: NORMAL
KETONES UR STRIP-MCNC: NEGATIVE MG/DL
LEUKOCYTE ESTERASE UR QL STRIP: NEGATIVE
NITRITE UR QL STRIP: NEGATIVE
P AXIS: 58 DEGREES
PH UR STRIP.AUTO: 8 [PH]
PR INTERVAL: 172 MS
PROT UR STRIP-MCNC: NEGATIVE MG/DL
QRS AXIS: 30 DEGREES
QRSD INTERVAL: 94 MS
QT INTERVAL: 424 MS
QTC INTERVAL: 409 MS
SP GR UR STRIP.AUTO: 1.01 (ref 1–1.03)
T WAVE AXIS: 44 DEGREES
UROBILINOGEN UR STRIP-ACNC: <2 MG/DL
VENTRICULAR RATE: 56 BPM

## 2023-06-16 PROCEDURE — 36415 COLL VENOUS BLD VENIPUNCTURE: CPT

## 2023-06-16 PROCEDURE — 99284 EMERGENCY DEPT VISIT MOD MDM: CPT

## 2023-06-16 PROCEDURE — 93005 ELECTROCARDIOGRAM TRACING: CPT

## 2023-06-16 PROCEDURE — 93010 ELECTROCARDIOGRAM REPORT: CPT | Performed by: INTERNAL MEDICINE

## 2023-06-16 PROCEDURE — 81003 URINALYSIS AUTO W/O SCOPE: CPT

## 2023-06-16 NOTE — Clinical Note
Milka Arlette was seen and treated in our emergency department on 6/16/2023  No restrictions            Diagnosis:     Jonathon Carr  may return to work on return date  He may return on this date: 06/18/2023         If you have any questions or concerns, please don't hesitate to call        Afshan Wise MD    ______________________________           _______________          _______________  [facility] Representative                              Date                                Time

## 2023-06-16 NOTE — ED ATTENDING ATTESTATION
Final Diagnoses:     1  Scrotal pain    2  Scrotal varices      ED Course as of 06/16/23 1510   Fri Jun 16, 2023   1228 Procedure Note: EKG  Date/Time: 06/16/23 12:28 PM   Interpreted by: LUANA Garcia   Indications / Diagnosis: abdominal pain; first nurse  ECG reviewed by me, the ED Provider: yes   The EKG demonstrates:  Rhythm: normal sinus  Intervals: normal intervals  Axis: normal axis  QRS/Blocks: normal QRS  ST Changes: No acute ST Changes, no STD/PADMINI    1415 Leukocytes, UA: Negative   1415 Nitrite, UA: Negative       IBobby MD, saw and evaluated the patient  All available labs and X-rays were ordered by me or the resident / non-physician and have been reviewed by myself  I discussed the patient with the resident / non-physician and agree with the resident's / non-physician practitioner's findings and plan as documented in the resident's / non-physician practicitioner's note, except where noted  At this point, I agree with the current assessment done in the ED  I was present during key portions of all procedures performed unless otherwise stated  Nursing Triage:     Chief Complaint   Patient presents with   • Abdominal Pain     Abdominal pain radiating to groin x4 years  Increase today       HPI:   This is a 48 y o  male presenting for evaluation of LEFT varicocele pain  Saw urology in the past for same since has had pain x4 years  Motrin PTA took away pain  Denies any urinary tract infection symptoms (burning, itching, pain, blood, frequency)  No discharge  No sexual activity  ASSESSMENT + PLAN:   Urine + f/u urology  Currently pain free  Doesn't sound like torsion and has exam consistent with varicocele  Physical:   Pertinent: cremasteric intact  Exam consistent with bilateral varicocele  General: VS reviewed  Appears in NAD  awake, alert  Well-nourished, well-developed  Appears stated age  Speaking normally in full sentences     Head: Normocephalic, atraumatic  Eyes: EOM-I  No diplopia  No hyphema  No subconjunctival hemorrhages  Symmetrical lids  ENT: Atraumatic external nose and ears  MMM  No malocclusion  No stridor  Normal phonation  No drooling  Normal swallowing  Neck: No JVD  CV: No pallor noted  Lungs:   No tachypnea  No respiratory distress  Abd: soft nt nd no rebound/guarding  Points to LLQ as where some pain was earlier but has completely resolved  MSK:   FROM spontaneously  Skin: Dry, intact  Neuro: Awake, alert, GCS15, CN II-XII grossly intact  Motor grossly intact  Psychiatric/Behavioral: interacting normally; appropriate mood/affect   Exam: uncircumcized penis  Testicles with very prominent varicocele  Cremasteric intact bilatrally  No disomcofrt currentl y     Vitals:    06/16/23 1145 06/16/23 1200 06/16/23 1300   BP: 116/65 120/73 118/70   BP Location: Right arm     Pulse: (!) 54 58 55   Resp: 14 14 16   Temp:  98 6 °F (37 °C)    SpO2: 96% 96% 98%     - There are no obvious limitations to social determinants of care  - Nursing note reviewed  - Vitals reviewed  - Orders placed by myself and/or advanced practitioner / resident     - Previous chart was reviewed  - No language barrier    - History obtained from patient  - There are no limitations to the history obtained:     Past Medical:    has a past medical history of HLD (hyperlipidemia) and No known health problems  Past Surgical:    has no past surgical history on file  Social:     Social History     Substance and Sexual Activity   Alcohol Use No     Social History     Tobacco Use   Smoking Status Never   Smokeless Tobacco Never     Social History     Substance and Sexual Activity   Drug Use No       Echo:   No results found for this or any previous visit  No results found for this or any previous visit  Cath:    No results found for this or any previous visit        Code Status: No Order  Advance Directive and Living Will:      Power of : POLST:    Medications - No data to display  No orders to display     Orders Placed This Encounter   Procedures   • Carlos draw   • UA w Reflex to Microscopic w Reflex to Culture   • Ambulatory Referral to Urology   • ECG 12 lead     Labs Reviewed   RAINBOW DRAW    Narrative: The following orders were created for panel order Carlos draw  Procedure                               Abnormality         Status                     ---------                               -----------         ------                     Jamse  Top on NFZI[[id number]]                           Final result               Green / Yellow tube on XQKV[[id number]]                      Final result               Green / Black tube on WATK[[id number]]                       Final result               Lavender Top 3 ml on FRUF[[id number]]                        Final result                 Please view results for these tests on the individual orders  UA W REFLEX TO MICROSCOPIC WITH REFLEX TO CULTURE       Result Value Ref Range Status    Color, UA Light Yellow   Final    Clarity, UA Turbid   Final    Specific Gravity, UA 1 013  1 003 - 1 030 Final    pH, UA 8 0  4 5, 5 0, 5 5, 6 0, 6 5, 7 0, 7 5, 8 0 Final    Leukocytes, UA Negative  Negative Final    Nitrite, UA Negative  Negative Final    Protein, UA Negative  Negative mg/dl Final    Glucose, UA Negative  Negative mg/dl Final    Ketones, UA Negative  Negative mg/dl Final    Urobilinogen, UA <2 0  <2 0 mg/dl mg/dl Final    Bilirubin, UA Negative  Negative Final    Occult Blood, UA Negative  Negative Final   LIGHT BLUE TOP   GREEN / YELLOW TUBE ON HOLD    Extra Tube Hold for add-ons  Final    Comment: Auto resulted  GREEN / BLACK TUBE ON HOLD    Extra Tube Hold for add-ons  Final    Comment: Auto resulted  LAVENDER TOP 3 ML ON HOLD    Extra Tube Hold for add-ons  Final    Comment: Auto resulted       Time reflects when diagnosis was documented in both MDM as applicable and the Disposition within this note     Time User Action Codes Description Comment    6/16/2023  2:03 PM Marichuy Odell [N50 82] Scrotal pain     6/16/2023  2:03 PM Martha Steele [I86 1] Scrotal varices       ED Disposition     ED Disposition   Discharge    Condition   Stable    Date/Time   Fri Jun 16, 2023  2:02 PM    Thierno 76 discharge to home/self care  Follow-up Information     Follow up With Specialties Details Why [address] [place] [address] [facility]rology [facility] Schedule an appointment as soon as possible for a visit in 1 week  [place] [phone]  [phone]  [facility], [address] [facility], [place], [place], [id number] [phone]        Discharge Medication List as of 6/16/2023  2:04 PM      CONTINUE these medications which have NOT CHANGED    Details   famotidine (PEPCID) 20 mg tablet Take 1 tablet (20 mg total) by mouth 2 (two) times a day, Starting Mon 2/25/2019, Print      meclizine (ANTIVERT) 25 mg tablet Take 1 tablet (25 mg total) by mouth every 8 (eight) hours as needed for dizziness for up to 3 days, Starting Sun 8/14/2022, Until Wed 8/17/2022 at 2359, Normal      ondansetron (ZOFRAN-ODT) 4 mg disintegrating tablet Take 1 tablet (4 mg total) by mouth every 6 (six) hours as needed for nausea or vomiting, Starting Wed 1/9/2019, Print      sucralfate (CARAFATE) 1 g tablet Take 1 tablet (1 g total) by mouth 4 (four) times a day, Starting Mon 2/25/2019, Print      !! tamsulosin (FLOMAX) 0 4 mg Take 0 4 mg by mouth, Starting Tue 3/6/2018, Historical Med      !! tamsulosin (FLOMAX) 0 4 mg Take 1 capsule (0 4 mg total) by mouth daily with dinner, Starting Wed 10/13/2021, Print       !! - Potential duplicate medications found  Please discuss with provider            Prior to Admission Medications   Prescriptions Last Dose Informant "Patient Reported? Taking?   famotidine (PEPCID) 20 mg tablet   No No   Sig: Take 1 tablet (20 mg total) by mouth 2 (two) times a day   meclizine (ANTIVERT) 25 mg tablet   No No   Sig: Take 1 tablet (25 mg total) by mouth every 8 (eight) hours as needed for dizziness for up to 3 days   ondansetron (ZOFRAN-ODT) 4 mg disintegrating tablet   No No   Sig: Take 1 tablet (4 mg total) by mouth every 6 (six) hours as needed for nausea or vomiting   sucralfate (CARAFATE) 1 g tablet   No No   Sig: Take 1 tablet (1 g total) by mouth 4 (four) times a day   tamsulosin (FLOMAX) 0 4 mg   Yes No   Sig: Take 0 4 mg by mouth   tamsulosin (FLOMAX) 0 4 mg   No No   Sig: Take 1 capsule (0 4 mg total) by mouth daily with dinner      Facility-Administered Medications: None                        Portions of the record may have been created with voice recognition software  Occasional wrong word or \"sound a like\" substitutions may have occurred due to the inherent limitations of voice recognition software  Read the chart carefully and recognize, using context, where substitutions have occurred      Electronically signed by:  Tamra Cisneros    "

## 2023-06-16 NOTE — ED PROVIDER NOTES
History  Chief Complaint   Patient presents with   • Abdominal Pain     Abdominal pain radiating to groin x4 years  Increase today     62-year-old man with relevant PMH presents with testicular pain  Patient reports a history of testicular pain secondary to a varicocele  This pain worsened today around 10 this morning  He reportedly has not had this pain severe for a few years  He reports a burning pain of only his left testicle, but he does have a history of bilateral varicoceles of which this pain is similar  The last time he saw a urologist was December 2022  No fever, chills, dysuria, or hematuria  He took Motrin prior to arrival which relieved his pain  He denies sexual activity for the last 3 months  Interpretor Isamar Herbert [id number] used  Prior to Admission Medications   Prescriptions Last Dose Informant Patient Reported? Taking?   famotidine (PEPCID) 20 mg tablet   No No   Sig: Take 1 tablet (20 mg total) by mouth 2 (two) times a day   meclizine (ANTIVERT) 25 mg tablet   No No   Sig: Take 1 tablet (25 mg total) by mouth every 8 (eight) hours as needed for dizziness for up to 3 days   ondansetron (ZOFRAN-ODT) 4 mg disintegrating tablet   No No   Sig: Take 1 tablet (4 mg total) by mouth every 6 (six) hours as needed for nausea or vomiting   sucralfate (CARAFATE) 1 g tablet   No No   Sig: Take 1 tablet (1 g total) by mouth 4 (four) times a day   tamsulosin (FLOMAX) 0 4 mg   Yes No   Sig: Take 0 4 mg by mouth   tamsulosin (FLOMAX) 0 4 mg   No No   Sig: Take 1 capsule (0 4 mg total) by mouth daily with dinner      Facility-Administered Medications: None       Past Medical History:   Diagnosis Date   • HLD (hyperlipidemia)    • No known health problems        PSH: none    History reviewed  No pertinent family history  I have reviewed and agree with the history as documented      E-Cigarette/Vaping     E-Cigarette/Vaping Substances     Social History     Tobacco Use   • Smoking status: Never   • Smokeless tobacco: Never   Substance Use Topics   • Alcohol use: No   • Drug use: No        Review of Systems   Constitutional: Negative for chills and fever  HENT: Negative for ear pain and sore throat  Eyes: Negative for pain and visual disturbance  Respiratory: Negative for cough and shortness of breath  Cardiovascular: Negative for chest pain and palpitations  Gastrointestinal: Negative for abdominal pain, constipation, diarrhea and vomiting  Genitourinary: Positive for testicular pain  Negative for difficulty urinating, dysuria and hematuria  Musculoskeletal: Negative for arthralgias and back pain  Skin: Negative for color change and rash  Neurological: Negative for seizures, syncope and light-headedness  Psychiatric/Behavioral: Negative for agitation and confusion  Physical Exam  ED Triage Vitals   Temperature Pulse Respirations Blood Pressure SpO2   06/16/23 1200 06/16/23 1145 06/16/23 1145 06/16/23 1145 06/16/23 1145   98 6 °F (37 °C) (!) 54 14 116/65 96 %      Temp src Heart Rate Source Patient Position - Orthostatic VS BP Location FiO2 (%)   -- 06/16/23 1145 06/16/23 1145 06/16/23 1145 --    Monitor Sitting Right arm       Pain Score       06/16/23 1145       No Pain             Orthostatic Vital Signs  Vitals:    06/16/23 1145 06/16/23 1200 06/16/23 1300   BP: 116/65 120/73 118/70   Pulse: (!) 54 58 55   Patient Position - Orthostatic VS: Sitting         Physical Exam  Vitals and nursing note reviewed  Constitutional:       General: He is not in acute distress  Appearance: Normal appearance  He is well-developed  HENT:      Head: Normocephalic and atraumatic  Right Ear: External ear normal       Left Ear: External ear normal    Cardiovascular:      Rate and Rhythm: Normal rate and regular rhythm  Pulmonary:      Effort: Pulmonary effort is normal  No respiratory distress  Breath sounds: Normal breath sounds  Abdominal:      Palpations: Abdomen is soft  Tenderness: There is no abdominal tenderness  There is no guarding or rebound  Genitourinary:     Penis: Normal        Testes: Cremasteric reflex is present  Right: Varicocele present  Tenderness not present  Cremasteric reflex is present  Left: Varicocele present  Tenderness not present  Cremasteric reflex is present  Musculoskeletal:         General: Normal range of motion  Cervical back: Normal range of motion and neck supple  Skin:     General: Skin is warm and dry  Neurological:      Mental Status: He is alert and oriented to person, place, and time  Mental status is at baseline  Psychiatric:         Mood and Affect: Mood normal          Behavior: Behavior normal          ED Medications  Medications - No data to display    Diagnostic Studies  Results Reviewed     Procedure Component Value Units Date/Time    UA w Reflex to Microscopic w Reflex to Culture [[id number]] Collected: 06/16/23 1312    Lab Status: Final result Specimen: Urine, Clean Catch Updated: 06/16/23 1334     Color, UA Light Yellow     Clarity, UA Turbid     Specific Rougon, UA 1 013     pH, UA 8 0     Leukocytes, UA Negative     Nitrite, UA Negative     Protein, UA Negative mg/dl      Glucose, UA Negative mg/dl      Ketones, UA Negative mg/dl      Urobilinogen, UA <2 0 mg/dl      Bilirubin, UA Negative     Occult Blood, UA Negative    [place] draw [[id number]] Collected: 06/16/23 1146    Lab Status: Final result Specimen: Blood from Arm, Right Updated: 06/16/23 1301    Narrative: The following orders were created for panel order [place] draw    Procedure                               Abnormality         Status                     ---------                               -----------         ------                     Collin Gums Top on [id number]]                           Final result               Green / Yellow tube on NU[[id number]]                      Final result               Green / Black tube on "[id number]]                       Final result               Lavender Top 3 ml on RSFG[[id number]]                        Final result                 Please view results for these tests on the individual orders  No orders to display         Procedures  Procedures      ED Course       Medical Decision Making  Presents with left lower abdominal and testicular pain  Patient reports symptoms have completely resolved after taking Motrin at home  He does have some varicoceles bilaterally that are palpable and tender  I recommend he follow up with urology for further management and possible surgical treatment of his varicoceles  UA without signs of infection  No indication for further imaging at this time  Doubt torsion at this time with benign testicular exam and resolution of his symptoms    Patient in agreement with plan and questions were answered  Verbalized understanding of return precautions  Portions or all of this note were generated using voice recognition software  Occasional wrong word or \"sound a like\" substitutions may have occurred due to the inherent limitations of voice recognition software  Please interpret any errors within the intended context of the whole sentence or idea  Disposition  Final diagnoses:   Scrotal pain   Scrotal varices     Time reflects when diagnosis was documented in both MDM as applicable and the Disposition within this note     Time User Action Codes Description Comment    6/16/2023  2:03 PM Prudence Coombe [N50 82] Scrotal pain     6/16/2023  2:03 PM Opal Rafy Add [I86 1] Scrotal varices       ED Disposition     ED Disposition   Discharge    Condition   Stable    Date/Time   Fri Jun 16, 2023  2:02 PM    Thierno Little discharge to home/self care                 Follow-up Information     Follow up With Specialties Details Why Contact Info Additional [address] [place] [facility] Schedule an " appointment as soon as possible for a visit in 1 week  [place] [phone]  [phone]  [facility], [address], [facility], [place], [address] [phone]          Discharge Medication List as of 6/16/2023  2:04 PM      CONTINUE these medications which have NOT CHANGED    Details   famotidine (PEPCID) 20 mg tablet Take 1 tablet (20 mg total) by mouth 2 (two) times a day, Starting Mon 2/25/2019, Print      meclizine (ANTIVERT) 25 mg tablet Take 1 tablet (25 mg total) by mouth every 8 (eight) hours as needed for dizziness for up to 3 days, Starting Sun 8/14/2022, Until Wed 8/17/2022 at 2359, Normal      ondansetron (ZOFRAN-ODT) 4 mg disintegrating tablet Take 1 tablet (4 mg total) by mouth every 6 (six) hours as needed for nausea or vomiting, Starting Wed 1/9/2019, Print      sucralfate (CARAFATE) 1 g tablet Take 1 tablet (1 g total) by mouth 4 (four) times a day, Starting Mon 2/25/2019, Print      !! tamsulosin (FLOMAX) 0 4 mg Take 0 4 mg by mouth, Starting Tue 3/6/2018, Historical Med      !! tamsulosin (FLOMAX) 0 4 mg Take 1 capsule (0 4 mg total) by mouth daily with dinner, Starting Wed 10/13/2021, Print       !! - Potential duplicate medications found  Please discuss with provider  PDMP Review     None           ED Provider  Attending physically available and evaluated [facility]  I managed the patient along with the ED Attending      Electronically Signed by         Matt Khoury MD  06/16/23 2536

## 2023-06-16 NOTE — DISCHARGE INSTRUCTIONS
Puede reggie 975 mg de paracetamol (la kelvin incluye Tylenol) y 400 mg de ibuprofeno (las marcas incluyen Advil o Motrin) cada 6 horas para el dolor/fiebre  Arnav un seguimiento con urología en 1-2 semanas para un mayor manejo de raciel síntomas  You can take 975 mg acetaminophen (brand name includes Tylenol) and 400 mg ibuprofen (brand names include Advil or Motrin) every 6 hours for pain/fever  Follow up with urology in 1-2 weeks for further management of your symptoms